# Patient Record
Sex: MALE | Race: WHITE | Employment: UNEMPLOYED | URBAN - METROPOLITAN AREA
[De-identification: names, ages, dates, MRNs, and addresses within clinical notes are randomized per-mention and may not be internally consistent; named-entity substitution may affect disease eponyms.]

---

## 2017-02-02 RX ORDER — NYSTATIN 100000 U/G
CREAM TOPICAL 2 TIMES DAILY
COMMUNITY
End: 2017-06-29

## 2017-02-08 ENCOUNTER — ANESTHESIA EVENT (OUTPATIENT)
Dept: ENDOSCOPY | Age: 62
End: 2017-02-08
Payer: MEDICARE

## 2017-02-09 ENCOUNTER — HOSPITAL ENCOUNTER (OUTPATIENT)
Age: 62
Setting detail: OUTPATIENT SURGERY
Discharge: HOME | End: 2017-02-09
Attending: INTERNAL MEDICINE | Admitting: INTERNAL MEDICINE
Payer: MEDICARE

## 2017-02-09 ENCOUNTER — ANESTHESIA (OUTPATIENT)
Dept: ENDOSCOPY | Age: 62
End: 2017-02-09
Payer: MEDICARE

## 2017-02-09 ENCOUNTER — SURGERY (OUTPATIENT)
Age: 62
End: 2017-02-09

## 2017-02-09 VITALS
HEART RATE: 59 BPM | RESPIRATION RATE: 13 BRPM | BODY MASS INDEX: 39.08 KG/M2 | DIASTOLIC BLOOD PRESSURE: 67 MMHG | WEIGHT: 273 LBS | HEIGHT: 70 IN | SYSTOLIC BLOOD PRESSURE: 111 MMHG | TEMPERATURE: 97.7 F | OXYGEN SATURATION: 95 %

## 2017-02-09 PROCEDURE — 74011000250 HC RX REV CODE- 250

## 2017-02-09 PROCEDURE — 77030011640 HC PAD GRND REM COVD -A: Performed by: INTERNAL MEDICINE

## 2017-02-09 PROCEDURE — 74011000250 HC RX REV CODE- 250: Performed by: NURSE ANESTHETIST, CERTIFIED REGISTERED

## 2017-02-09 PROCEDURE — 74011250636 HC RX REV CODE- 250/636: Performed by: NURSE ANESTHETIST, CERTIFIED REGISTERED

## 2017-02-09 PROCEDURE — 76040000007: Performed by: INTERNAL MEDICINE

## 2017-02-09 PROCEDURE — 76060000031 HC ANESTHESIA FIRST 0.5 HR: Performed by: INTERNAL MEDICINE

## 2017-02-09 PROCEDURE — 88305 TISSUE EXAM BY PATHOLOGIST: CPT | Performed by: INTERNAL MEDICINE

## 2017-02-09 PROCEDURE — 77030013992 HC SNR POLYP ENDOSC BSC -B: Performed by: INTERNAL MEDICINE

## 2017-02-09 PROCEDURE — 74011250636 HC RX REV CODE- 250/636

## 2017-02-09 RX ORDER — LIDOCAINE HYDROCHLORIDE 20 MG/ML
INJECTION, SOLUTION EPIDURAL; INFILTRATION; INTRACAUDAL; PERINEURAL AS NEEDED
Status: DISCONTINUED | OUTPATIENT
Start: 2017-02-09 | End: 2017-02-09 | Stop reason: HOSPADM

## 2017-02-09 RX ORDER — PROPOFOL 10 MG/ML
INJECTION, EMULSION INTRAVENOUS AS NEEDED
Status: DISCONTINUED | OUTPATIENT
Start: 2017-02-09 | End: 2017-02-09 | Stop reason: HOSPADM

## 2017-02-09 RX ORDER — FAMOTIDINE 10 MG/ML
20 INJECTION INTRAVENOUS ONCE
Status: COMPLETED | OUTPATIENT
Start: 2017-02-09 | End: 2017-02-09

## 2017-02-09 RX ORDER — SODIUM CHLORIDE, SODIUM LACTATE, POTASSIUM CHLORIDE, CALCIUM CHLORIDE 600; 310; 30; 20 MG/100ML; MG/100ML; MG/100ML; MG/100ML
75 INJECTION, SOLUTION INTRAVENOUS CONTINUOUS
Status: DISCONTINUED | OUTPATIENT
Start: 2017-02-09 | End: 2017-02-09 | Stop reason: HOSPADM

## 2017-02-09 RX ORDER — SODIUM CHLORIDE 0.9 % (FLUSH) 0.9 %
5-10 SYRINGE (ML) INJECTION EVERY 8 HOURS
Status: DISCONTINUED | OUTPATIENT
Start: 2017-02-09 | End: 2017-02-09 | Stop reason: HOSPADM

## 2017-02-09 RX ORDER — SODIUM CHLORIDE 0.9 % (FLUSH) 0.9 %
5-10 SYRINGE (ML) INJECTION AS NEEDED
Status: DISCONTINUED | OUTPATIENT
Start: 2017-02-09 | End: 2017-02-09 | Stop reason: HOSPADM

## 2017-02-09 RX ADMIN — PROPOFOL 40 MG: 10 INJECTION, EMULSION INTRAVENOUS at 08:49

## 2017-02-09 RX ADMIN — LIDOCAINE HYDROCHLORIDE 20 MG: 20 INJECTION, SOLUTION EPIDURAL; INFILTRATION; INTRACAUDAL; PERINEURAL at 08:49

## 2017-02-09 RX ADMIN — FAMOTIDINE 20 MG: 10 INJECTION, SOLUTION INTRAVENOUS at 07:57

## 2017-02-09 RX ADMIN — PROPOFOL 40 MG: 10 INJECTION, EMULSION INTRAVENOUS at 08:58

## 2017-02-09 RX ADMIN — PROPOFOL 40 MG: 10 INJECTION, EMULSION INTRAVENOUS at 09:02

## 2017-02-09 RX ADMIN — PROPOFOL 40 MG: 10 INJECTION, EMULSION INTRAVENOUS at 08:54

## 2017-02-09 RX ADMIN — SODIUM CHLORIDE, SODIUM LACTATE, POTASSIUM CHLORIDE, AND CALCIUM CHLORIDE 75 ML/HR: 600; 310; 30; 20 INJECTION, SOLUTION INTRAVENOUS at 07:57

## 2017-02-09 NOTE — H&P
[facility]    [email or web address]      Impression:   1.hx of polyps      Plan:     1. Pooler mac all risks discussed       Chief Complaint: hx polyps      HPI:  Rohini Lorenzo is a 62 y.o. male who is being seen on consult for hx of polyps. PMH:   Past Medical History   Diagnosis Date    Anxiety     Arthritis     Back pain 6/22/2010    Cancer [facility])      Prostate    Chronic obstructive pulmonary disease (HCC)      PT DENIES    Hernia of unspecified site of abdominal cavity without mention of obstruction or gangrene     Radiculopathy     Tobacco dependence        PSH:   Past Surgical History   Procedure Laterality Date    Hx hernia repair  08/2007     Ventral    Hx other surgical  10/2008     Prostate    Hx back surgery  11/10    Hx lumbar laminectomy  6/2015     L4, L5       Social HX:   Social History     Social History    Marital status:      Spouse name: N/A    Number of children: N/A    Years of education: N/A     Occupational History    Not on file. Social History Main Topics    Smoking status: Former Smoker     Packs/day: 1.00     Years: 35.00     Quit date: 10/1/2008    Smokeless tobacco: Never Used    Alcohol use 0.6 oz/week     1 Standard drinks or equivalent per week      Comment: twice per year    Drug use: No    Sexual activity: Not on file     Other Topics Concern    Not on file     Social History Narrative       FHX:   Family History   Problem Relation Age of Onset    Heart Disease Father     Cancer Other     Cancer Other        Allergy:   Allergies   Allergen Reactions    Morphine Other (comments)     Chest pain       Home Medications:     Prescriptions Prior to Admission   Medication Sig    nystatin (MYCOSTATIN) topical cream Apply  to affected area two (2) times a day.  ibuprofen (MOTRIN) 800 mg tablet Take  by mouth.  MULTIVIT &MINERALS/FERROUS FUM (MULTI VITAMIN PO) Take  by mouth.     CHOLECALCIFEROL, VITAMIN D3, (VITAMIN D3 PO) Take  by mouth.  rosuvastatin (CRESTOR) 10 mg tablet Take 10 mg by mouth nightly.  oxyCODONE (ROXICODONE) 10 mg Tab tablet Take  by mouth.  CALCIUM CARBONATE/VITAMIN D3 (CALCIUM+D PO) Take  by mouth.  CYANOCOBALAMIN, VITAMIN B-12, (VITAMIN B-12 PO) Take  by mouth.  HYDROCODONE BIT/ACETAMINOPHEN (VICODIN ES PO) Take 750 mg by mouth four (4) times daily.  ALPRAZOLAM (XANAX PO) Take 0.5 mg by mouth four (4) times daily.  DOCOSAHEXANOIC ACID/EPA (FISH OIL PO) Take  by mouth.  HYDROcodone-acetaminophen (NORCO) 5-325 mg per tablet     HYDROcodone-acetaminophen (NORCO)  mg tablet Take 1 Tab by mouth every six (6) hours as needed for Pain. Max Daily Amount: 4 Tabs.  clobetasol (TEMOVATE) 0.05 % topical cream Apply  to affected area two (2) times a day.  VARDENAFIL HCL (LEVITRA PO) Take  by mouth.  ALBUTEROL IN Take  by inhalation.  etodolac (LODINE) 500 mg tablet Take 500 mg by mouth two (2) times a day.  Diclofenac Sodium (VOLTAREN) 1 % topical gel Apply 4 g to affected area four (4) times daily. Review of Systems:     Constitutional: No fevers, chills, weight loss, fatigue. Skin: No rashes, pruritis, jaundice, ulcerations, erythema. HENT: No headaches, nosebleeds, sinus pressure, rhinorrhea, sore throat. Eyes: No visual changes, blurred vision, eye pain, photophobia, jaundice. Cardiovascular: No chest pain, heart palpitations. Respiratory: No cough, SOB, wheezing, chest discomfort, orthopnea. Gastrointestinal: neg   Genitourinary: No dysuria, bleeding, discharge, pyuria. Musculoskeletal: No weakness, arthralgias, wasting. Endo: No sweats. Heme: No bruising, easy bleeding. Allergies: As noted. Neurological: Cranial nerves intact. Alert and oriented. Gait not assessed. Psychiatric:  No anxiety, depression, hallucinations.                  Visit Vitals    /65    Pulse 62    Temp 97.8 °F (36.6 °C)    Resp 18    Ht 5' 10\" (1.778 m)    Wt 123.8 kg (273 lb)    SpO2 96%    BMI 39.17 kg/m2       Physical Assessment:     constitutional: appearance: well developed, well nourished, normal habitus, no deformities, in no acute distress. skin: inspection: no rashes, ulcers, icterus or other lesions; no clubbing or telangiectasias. palpation: no induration or subcutaneos nodules. eyes: inspection: normal conjunctivae and lids; no jaundice pupils: symmetrical, normoreactive to light, normal accommodation and size. ENMT: mouth: normal oral mucosa,lips and gums; good dentition. oropharynx: normal tongue, hard and soft palate; posterior pharynx without erythema, exudate or lesions. neck: no masses organomegaly or tenderness. respiratory: effort: normal chest excursion; no intercostal retraction or accessory muscle use. cardiovascular: abdominal aorta: normal size and position; no bruits. palpation: PMI of normal size and position; normal rhythm; no thrill or murmurs. abdominal: abdomen: normal consistency; no tenderness or masses. hernias: no hernias appreciated. liver: normal size and consistency. spleen: not palpable. rectal: hemoccult/guaiac: not performed. musculoskeletal: no deformities or muscle wasting   lymphatic: axilae: not palpable. groin: not palpable. neck: within normal limits. other: not palpable. neurologic: cranial nerves: II-XII normal.   psychiatric: judgement/insight: within normal limits. memory: within normal limits for recent and remote events. mood and affect: no evidence of depression, anxiety or agitation. orientation: oriented to time, space and person. Basic Metabolic Profile   No results for input(s): NA, K, CL, CO2, BUN, GLU, CA, MG, PHOS in the last 72 hours. No lab exists for component: CREAT      CBC w/Diff    No results for input(s): WBC, RBC, HGB, HCT, MCV, MCH, MCHC, RDW, PLT, HGBEXT, HCTEXT, PLTEXT in the last 72 hours.     No lab exists for component: MPV No results for input(s): GRANS, LYMPH, EOS, PRO, MYELO, METAS, BLAST in the last 72 hours. No lab exists for component: MONO, BASO     Hepatic Function   No results for input(s): ALB, TP, TBILI, GPT, SGOT, AP, AML, LPSE in the last 72 hours. No lab exists for component: Shakeel Delarosa MD, MHAZEL. [facility], [address] [place] [address]  [email or web address]

## 2017-02-09 NOTE — DISCHARGE INSTRUCTIONS
Colonoscopy: What to Expect at [address] [place] [address]  After you have a colonoscopy, you will stay at the clinic for 1 to 2 hours until the medicines wear off. Then you can go home. But you will need to arrange for a ride. Your doctor will tell you when you can eat and do your other usual activities. Your doctor will talk to you about when you will need your next colonoscopy. Your doctor can help you decide how often you need to be checked. This will depend on the results of your test and your risk for colorectal cancer. After the test, you may be bloated or have gas pains. You may need to pass gas. If a biopsy was done or a polyp was removed, you may have streaks of blood in your stool (feces) for a few days. This care sheet gives you a general idea about how long it will take for you to recover. But each person recovers at a different pace. Follow the steps below to get better as quickly as possible. How can you care for yourself at home? Activity  · Rest when you feel tired. · You can do your normal activities when it feels okay to do so. Diet  · Follow your doctor's directions for eating. · Unless your doctor has told you not to, drink plenty of fluids. This helps to replace the fluids that were lost during the colon prep. · Do not drink alcohol. Medicines  · If polyps were removed or a biopsy was done during the test, your doctor may tell you not to take aspirin or other anti-inflammatory medicines for a few days. These include ibuprofen (Advil, Motrin) and naproxen (Aleve). Other instructions  · For your safety, do not drive or operate machinery until the medicine wears off and you can think clearly. Your doctor may tell you not to drive or operate machinery until the day after your test.  · Do not sign legal documents or make major decisions until the medicine wears off and you can think clearly. The anesthesia can make it hard for you to fully understand what you are agreeing to.   Follow-up care is a key part of your treatment and safety. Be sure to make and go to all appointments, and call your doctor if you are having problems. It's also a good idea to know your test results and keep a list of the medicines you take. When should you call for help? Call 911 anytime you think you may need emergency care. For example, call if:  · You passed out (lost consciousness). · You pass maroon or bloody stools. · You have severe belly pain. Call your doctor now or seek immediate medical care if:  · Your stools are black and tarlike. · Your stools have streaks of blood, but you did not have a biopsy or any polyps removed. · You have belly pain, or your belly is swollen and firm. · You vomit. · You have a fever. · You are very dizzy. Watch closely for changes in your health, and be sure to contact your doctor if you have any problems. Where can you learn more? Go to Style on Screen[employer]be  Enter E264 in the search box to learn more about \"Colonoscopy: What to Expect at Home. \"   © 7723-5294 [employer]. Care instructions adapted under license by [employer] (which disclaims liability or warranty for this information). This care instruction is for use with your licensed healthcare professional. If you have questions about a medical condition or this instruction, always ask your healthcare professional. [address] any warranty or liability for your use of this information. Content Version: 20.8.504888; Current as of: November 14, 2014     Colon Polyps: Care Instructions  Your Care Instructions    Colon polyps are growths in the colon or the rectum. The cause of most colon polyps is not known, and most people who get them do not have any problems. But a certain kind can turn into cancer. For this reason, regular testing for colon polyps is important for people age 48 and older and anyone who has an increased risk for colon cancer.   Polyps are usually found through routine colon cancer screening tests. Although most colon polyps are not cancerous, they are usually removed and then tested for cancer. Screening for colon cancer saves lives because the cancer can usually be cured if it is caught early. If you have a polyp that is the type that can turn into cancer, you may need more tests to examine your entire colon. The doctor will remove any other polyps that he or she finds, and you will be tested more often. Follow-up care is a key part of your treatment and safety. Be sure to make and go to all appointments, and call your doctor if you are having problems. It's also a good idea to know your test results and keep a list of the medicines you take. How can you care for yourself at home? Regular exams to look for colon polyps are the best way to prevent polyps from turning into colon cancer. These can include stool tests, sigmoidoscopy, colonoscopy, and CT colonography. Talk with your doctor about a testing schedule that is right for you. To prevent polyps  There is no home treatment that can prevent colon polyps. But these steps may help lower your risk for cancer. · Stay active. Being active can help you get to and stay at a healthy weight. Try to exercise on most days of the week. Walking is a good choice. · Eat well. Choose a variety of vegetables, fruits, legumes (such as peas and beans), fish, poultry, and whole grains. · Do not smoke. If you need help quitting, talk to your doctor about stop-smoking programs and medicines. These can increase your chances of quitting for good. · If you drink alcohol, limit how much you drink. Limit alcohol to 2 drinks a day for men and 1 drink a day for women. When should you call for help? Call your doctor now or seek immediate medical care if:  · You have severe belly pain. · Your stools are maroon or very bloody.   Watch closely for changes in your health, and be sure to contact your doctor if:  · You have a fever.  · You have nausea or vomiting. · You have a change in bowel habits (new constipation or diarrhea). · Your symptoms get worse or are not improving as expected. Where can you learn more? Go to http://yogi-ethan.info/. Enter 95 [id number] in the search box to learn more about \"Colon Polyps: Care Instructions. \"  Current as of: August 24, 2016  Content Version: 11.1  © 6225-1608 [employer]. Care instructions adapted under license by [employer] (which disclaims liability or warranty for this information). If you have questions about a medical condition or this instruction, always ask your healthcare professional. [place] [address] any warranty or liability for your use of this information. DISCHARGE SUMMARY from Nurse     POST-PROCEDURE INSTRUCTIONS:    Call your Physician if you:  ? Observe any excess bleeding. ? Develop a temperature over 100.5o F.  ? Experience abdominal, shoulder or chest pain. ? Notice any signs of decreased circulation or nerve impairment to an extremity such as a change in color, persistent numbness, tingling, coldness or increase in pain. ? Vomit blood or you have nausea and vomiting lasting longer than 4 hours. ? Are unable to take medications. ? Are unable to urinate within 8 hours after discharge following general anesthesia or intravenous sedation. For the next 24 hours after receiving general anesthesia or intravenous sedation, or while taking prescription Narcotics, limit your activities:  ? Do NOT drive a motor vehicle, operate hazard machinery or power tools, or perform tasks that require coordination. The medication you received during your procedure may have some effect on your mental awareness. ? Do NOT make important personal or business decisions. The medication you received during your procedure may have some effect on your mental awareness. ? Do NOT drink alcoholic beverages.   These drinks do not mix well with the medications that have been given to you. ? Upon discharge from the hospital, you must be accompanied by a responsible adult. ? Resume your diet as directed by your physician. ? Resume medications as your physician has prescribed. ? Please give a list of your current medications to your Primary Care Provider. ? Please update this list whenever your medications are discontinued, doses are changed, or new medications (including over-the-counter products) are added. ? Please carry medication information at all times in case of emergency situations. These are general instructions for a healthy lifestyle:    No smoking/ No tobacco products/ Avoid exposure to second hand smoke.  Surgeon General's Warning:  Quitting smoking now greatly reduces serious risk to your health. Obesity, smoking, and a sedentary lifestyle greatly increase your risk for illness.  A healthy diet, regular physical exercise & weight monitoring are important for maintaining a healthy lifestyle   You may be retaining fluid if you have a history of heart failure or if you experience any of the following symptoms:  Weight gain of 3 pounds or more overnight or 5 pounds in a week, increased swelling in our hands or feet or shortness of breath while lying flat in bed. Please call your doctor as soon as you notice any of these symptoms; do not wait until your next office visit. Recognize signs and symptoms of STROKE:  F  -  Face looks uneven  A  -  Arms unable to move or move unevenly  S  -  Speech slurred or non-existent  T  -  Time to call 911 - as soon as signs and symptoms begin - DO NOT go back to bed or wait to see If you get better - TIME IS BRAIN. Colorectal Screening   Colorectal cancer almost always develops from precancerous polyps (abnormal growths) in the colon or rectum. Screening tests can find precancerous polyps, so that they can be removed before they turn into cancer.  Screening tests can also find colorectal cancer early, when treatment works best.  [employer] Speak with your physician about when you should begin screening and how often you should be tested. Additional Information    If you have questions, please call [phone]. Remember, MyChart is NOT to be used for urgent needs. For medical emergencies, dial 911. Educational references and/or instructions provided during this visit included:    Colon Polyps      APPOINTMENTS:    Please make a follow-up appointment with your physician. Discharge information has been reviewed with the patient. The patient verbalized understanding.

## 2017-02-09 NOTE — ANESTHESIA PREPROCEDURE EVALUATION
Anesthetic History               Review of Systems / Medical History  Patient summary reviewed, nursing notes reviewed and pertinent labs reviewed    Pulmonary    COPD: mild               Neuro/Psych              Cardiovascular  Within defined limits                     GI/Hepatic/Renal  Within defined limits              Endo/Other        Obesity and arthritis     Other Findings   Comments: Current Smoker? NO       Elective Surgery? Yes       Abstained from smoking 24 hours prior to anesthesia? N/A    Risk Factors for Postoperative nausea/vomiting:       History of postoperative nausea/vomiting? NO       Female? NO       Motion sickness? NO       Intended opioid administration for postoperative analgesia?   NO           Physical Exam    Airway  Mallampati: II  TM Distance: 4 - 6 cm  Neck ROM: normal range of motion   Mouth opening: Normal     Cardiovascular  Regular rate and rhythm,  S1 and S2 normal,  no murmur, click, rub, or gallop             Dental  No notable dental hx       Pulmonary  Breath sounds clear to auscultation               Abdominal  GI exam deferred       Other Findings            Anesthetic Plan    ASA: 3  Anesthesia type: MAC          Induction: Intravenous  Anesthetic plan and risks discussed with: Patient

## 2017-02-09 NOTE — IP AVS SNAPSHOT
[address] [address] Tamara Knox [address] [address] [address] [phone] Patient: Zahraa Sanders MRN: [id number] IHW:3/78/2705 You are allergic to the following Allergen Reactions Morphine Other (comments) Chest pain Recent Documentation Height Weight BMI Smoking Status 1.778 m 123.8 kg 39.17 kg/m2 Former Smoker Emergency Contacts Name Discharge Info Relation Home Work Mobile [address] CAREGIVER [3] Spouse [3] [phone] About your hospitalization You were admitted on:  February 9, 2017 You last received care in the:  [facility] You were discharged on:  February 9, 2017 Unit phone number:  [phone] Why you were hospitalized Your primary diagnosis was:  Not on File Providers Seen During Your Hospitalizations Provider Role Specialty Primary office phone Nayely Chopra MD Attending Provider Gastroenterology [phone] Your Primary Care Physician (PCP) Primary Care Physician Office Phone Office Fax Michael Paget [phone] Follow-up Information Follow up With Details Comments Contact Info Nayely Chopra MD   [address] [facility] 
[phone] Shira Falk MD   [address] [place] [address] [address] [facility] [address] [phone] Current Discharge Medication List  
  
CONTINUE these medications which have NOT CHANGED Dose & Instructions Dispensing Information Comments Morning Noon Evening Bedtime ALBUTEROL IN Your next dose is: Today, Tomorrow Other:  _________ Take  by inhalation. Refills:  0  
     
   
   
   
  
 CALCIUM+D PO Your next dose is: Today, Tomorrow Other:  _________ Take  by mouth. Refills:  0 clobetasol 0.05 % topical cream  
Commonly known as:  Acquanetta Doctor Your next dose is: Today, Tomorrow Other:  _________ Apply  to affected area two (2) times a day. Refills:  0  
     
   
   
   
  
 CRESTOR 10 mg tablet Generic drug:  rosuvastatin Your next dose is: Today, Tomorrow Other:  _________ Dose:  10 mg Take 10 mg by mouth nightly. Refills:  0  
     
   
   
   
  
 etodolac 500 mg tablet Commonly known as:  LODINE Your next dose is: Today, Tomorrow Other:  _________ Dose:  500 mg Take 500 mg by mouth two (2) times a day. Refills:  0  
     
   
   
   
  
 FISH OIL PO Your next dose is: Today, Tomorrow Other:  _________ Take  by mouth. Refills:  0  
     
   
   
   
  
 ibuprofen 800 mg tablet Commonly known as:  MOTRIN Your next dose is: Today, Tomorrow Other:  _________ Take  by mouth. Refills:  0 LEVITRA PO Your next dose is: Today, Tomorrow Other:  _________ Take  by mouth. Refills:  0 MULTI VITAMIN PO Your next dose is: Today, Tomorrow Other:  _________ Take  by mouth. Refills:  0  
     
   
   
   
  
 nystatin topical cream  
Commonly known as:  MYCOSTATIN Your next dose is: Today, Tomorrow Other:  _________ Apply  to affected area two (2) times a day. Refills:  0  
     
   
   
   
  
 oxyCODONE IR 10 mg Tab immediate release tablet Commonly known as:  Corrinne Mitten Your next dose is: Today, Tomorrow Other:  _________ Take  by mouth. Refills:  0  
     
   
   
   
  
 * VICODIN ES PO Your next dose is: Today, Tomorrow Other:  _________ Dose:  750 mg Take 750 mg by mouth four (4) times daily. Refills:  0 * HYDROcodone-acetaminophen  mg tablet Commonly known as:  Chika Kapadia Your next dose is: Today, Tomorrow Other:  _________ Dose:  1 Tab Take 1 Tab by mouth every six (6) hours as needed for Pain. Max Daily Amount: 4 Tabs. Quantity:  30 Tab Refills:  0  
     
   
   
   
  
 * HYDROcodone-acetaminophen 5-325 mg per tablet Commonly known as:  Chika Kapadia Your next dose is: Today, Tomorrow Other:  _________ Refills:  0  
     
   
   
   
  
 VITAMIN B-12 PO Your next dose is: Today, Tomorrow Other:  _________ Take  by mouth. Refills:  0  
     
   
   
   
  
 VITAMIN D3 PO Your next dose is: Today, Tomorrow Other:  _________ Take  by mouth. Refills:  0 VOLTAREN 1 % Gel Generic drug:  diclofenac Your next dose is: Today, Tomorrow Other:  _________ Dose:  4 g Apply 4 g to affected area four (4) times daily. Refills:  0  
     
   
   
   
  
 XANAX PO Your next dose is: Today, Tomorrow Other:  _________ Dose:  0.5 mg Take 0.5 mg by mouth four (4) times daily. Refills:  0  
     
   
   
   
  
 * Notice: This list has 3 medication(s) that are the same as other medications prescribed for you. Read the directions carefully, and ask your doctor or other care provider to review them with you. Discharge Instructions Colonoscopy: What to Expect at [facility] Your Recovery After you have a colonoscopy, you will stay at the clinic for 1 to 2 hours until the medicines wear off. Then you can go home. But you will need to arrange for a ride. Your doctor will tell you when you can eat and do your other usual activities. Your doctor will talk to you about when you will need your next colonoscopy. Your doctor can help you decide how often you need to be checked.  This will depend on the results of your test and your risk for colorectal cancer. After the test, you may be bloated or have gas pains. You may need to pass gas. If a biopsy was done or a polyp was removed, you may have streaks of blood in your stool (feces) for a few days. This care sheet gives you a general idea about how long it will take for you to recover. But each person recovers at a different pace. Follow the steps below to get better as quickly as possible. How can you care for yourself at home? Activity · Rest when you feel tired. · You can do your normal activities when it feels okay to do so. Diet · Follow your doctor's directions for eating. · Unless your doctor has told you not to, drink plenty of fluids. This helps to replace the fluids that were lost during the colon prep. · Do not drink alcohol. Medicines · If polyps were removed or a biopsy was done during the test, your doctor may tell you not to take aspirin or other anti-inflammatory medicines for a few days. These include ibuprofen (Advil, Motrin) and naproxen (Aleve). Other instructions · For your safety, do not drive or operate machinery until the medicine wears off and you can think clearly. Your doctor may tell you not to drive or operate machinery until the day after your test. 
· Do not sign legal documents or make major decisions until the medicine wears off and you can think clearly. The anesthesia can make it hard for you to fully understand what you are agreeing to. Follow-up care is a key part of your treatment and safety. Be sure to make and go to all appointments, and call your doctor if you are having problems. It's also a good idea to know your test results and keep a list of the medicines you take. When should you call for help? Call 911 anytime you think you may need emergency care. For example, call if: 
· You passed out (lost consciousness). · You pass maroon or bloody stools. · You have severe belly pain. Call your doctor now or seek immediate medical care if: · Your stools are black and tarlike. · Your stools have streaks of blood, but you did not have a biopsy or any polyps removed. · You have belly pain, or your belly is swollen and firm. · You vomit. · You have a fever. · You are very dizzy. Watch closely for changes in your health, and be sure to contact your doctor if you have any problems. Where can you learn more? Go to [employer] Enter E264 in the search box to learn more about \"Colonoscopy: What to Expect at Home. \"  
© 3223-1142 Healthwise, [employer]. Care instructions adapted under license by Tash Monsalve (which disclaims liability or warranty for this information). This care instruction is for use with your licensed healthcare professional. If you have questions about a medical condition or this instruction, always ask your healthcare professional. [address] any warranty or liability for your use of this information. Content Version: 57.6.954667; Current as of: November 14, 2014 Colon Polyps: Care Instructions Your Care Instructions Colon polyps are growths in the colon or the rectum. The cause of most colon polyps is not known, and most people who get them do not have any problems. But a certain kind can turn into cancer. For this reason, regular testing for colon polyps is important for people age 48 and older and anyone who has an increased risk for colon cancer. Polyps are usually found through routine colon cancer screening tests. Although most colon polyps are not cancerous, they are usually removed and then tested for cancer. Screening for colon cancer saves lives because the cancer can usually be cured if it is caught early. If you have a polyp that is the type that can turn into cancer, you may need more tests to examine your entire colon. The doctor will remove any other polyps that he or she finds, and you will be tested more often. Follow-up care is a key part of your treatment and safety. Be sure to make and go to all appointments, and call your doctor if you are having problems. It's also a good idea to know your test results and keep a list of the medicines you take. How can you care for yourself at home? Regular exams to look for colon polyps are the best way to prevent polyps from turning into colon cancer. These can include stool tests, sigmoidoscopy, colonoscopy, and CT colonography. Talk with your doctor about a testing schedule that is right for you. To prevent polyps There is no home treatment that can prevent colon polyps. But these steps may help lower your risk for cancer. · Stay active. Being active can help you get to and stay at a healthy weight. Try to exercise on most days of the week. Walking is a good choice. · Eat well. Choose a variety of vegetables, fruits, legumes (such as peas and beans), fish, poultry, and whole grains. · Do not smoke. If you need help quitting, talk to your doctor about stop-smoking programs and medicines. These can increase your chances of quitting for good. · If you drink alcohol, limit how much you drink. Limit alcohol to 2 drinks a day for men and 1 drink a day for women. When should you call for help? Call your doctor now or seek immediate medical care if: 
· You have severe belly pain. · Your stools are maroon or very bloody. Watch closely for changes in your health, and be sure to contact your doctor if: 
· You have a fever. · You have nausea or vomiting. · You have a change in bowel habits (new constipation or diarrhea). · Your symptoms get worse or are not improving as expected. Where can you learn more? Go to http://yogi-ethan.info/. Enter 95 [id number] in the search box to learn more about \"Colon Polyps: Care Instructions. \" Current as of: August 24, 2016 Content Version: 11.1 © 2179-1112 [employer].  Care instructions adapted under license by [address] (which disclaims liability or warranty for this information). If you have questions about a medical condition or this instruction, always ask your healthcare professional. [address] any warranty or liability for your use of this information. DISCHARGE SUMMARY from Nurse POST-PROCEDURE INSTRUCTIONS: 
 
Call your Physician if you: 
? Observe any excess bleeding. ? Develop a temperature over 100.5o F. 
? Experience abdominal, shoulder or chest pain. ? Notice any signs of decreased circulation or nerve impairment to an extremity such as a change in color, persistent numbness, tingling, coldness or increase in pain. ? Vomit blood or you have nausea and vomiting lasting longer than 4 hours. ? Are unable to take medications. ? Are unable to urinate within 8 hours after discharge following general anesthesia or intravenous sedation. For the next 24 hours after receiving general anesthesia or intravenous sedation, or while taking prescription Narcotics, limit your activities: 
? Do NOT drive a motor vehicle, operate hazard machinery or power tools, or perform tasks that require coordination. The medication you received during your procedure may have some effect on your mental awareness. ? Do NOT make important personal or business decisions. The medication you received during your procedure may have some effect on your mental awareness. ? Do NOT drink alcoholic beverages. These drinks do not mix well with the medications that have been given to you. ? Upon discharge from the hospital, you must be accompanied by a responsible adult. ? Resume your diet as directed by your physician. ? Resume medications as your physician has prescribed. ? Please give a list of your current medications to your Primary Care Provider. ?  Please update this list whenever your medications are discontinued, doses are changed, or new medications (including over-the-counter products) are added. ? Please carry medication information at all times in case of emergency situations. These are general instructions for a healthy lifestyle: No smoking/ No tobacco products/ Avoid exposure to second hand smoke. ? Surgeon General's Warning:  Quitting smoking now greatly reduces serious risk to your health. Obesity, smoking, and a sedentary lifestyle greatly increase your risk for illness. ? A healthy diet, regular physical exercise & weight monitoring are important for maintaining a healthy lifestyle ? You may be retaining fluid if you have a history of heart failure or if you experience any of the following symptoms:  Weight gain of 3 pounds or more overnight or 5 pounds in a week, increased swelling in our hands or feet or shortness of breath while lying flat in bed. Please call your doctor as soon as you notice any of these symptoms; do not wait until your next office visit. Recognize signs and symptoms of STROKE: 
F  -  Face looks uneven A  -  Arms unable to move or move unevenly S  -  Speech slurred or non-existent T  -  Time to call 911 - as soon as signs and symptoms begin - DO NOT go back to bed or wait to see If you get better - TIME IS BRAIN. Colorectal Screening ? Colorectal cancer almost always develops from precancerous polyps (abnormal growths) in the colon or rectum. Screening tests can find precancerous polyps, so that they can be removed before they turn into cancer. Screening tests can also find colorectal cancer early, when treatment works best. 
? Speak with your physician about when you should begin screening and how often you should be tested. Additional Information If you have questions, please call [phone]. Remember, [employer] is NOT to be used for urgent needs. For medical emergencies, dial 911.  
 
Educational references and/or instructions provided during this visit included: 
 
Colon Polyps APPOINTMENTS: 
 
Please make a follow-up appointment with your physician. Discharge information has been reviewed with the patient. The patient verbalized understanding. Discharge Orders None Introducing [facility]! Dear Mitesh Arriola: 
Thank you for requesting a [employer] account. Our records indicate that you already have an active [employer] account. You can access your account anytime at [email or web address] Did you know that you can access your hospital and ER discharge instructions at any time in [employer]? You can also review all of your test results from your hospital stay or ER visit. Additional Information If you have questions, please visit the Frequently Asked Questions section of the [employer] website at [email or web address] Remember, [employer] is NOT to be used for urgent needs. For medical emergencies, dial 911. Now available from your iPhone and Android! General Information Please provide this summary of care documentation to your next provider. Patient Signature:  ____________________________________________________________ Date:  ____________________________________________________________  
  
Jacqueline Obrien Provider Signature:  ____________________________________________________________ Date:  ____________________________________________________________

## 2017-02-09 NOTE — ANESTHESIA POSTPROCEDURE EVALUATION
Post-Anesthesia Evaluation and Assessment    Patient: Trupti Pradhan MRN: [id number]  SSN: [id number]    YOB: 1955  Age: 62 y.o. Sex: male       Cardiovascular Function/Vital Signs  Visit Vitals    /67    Pulse (!) 59    Temp 36.5 °C (97.7 °F)    Resp 13    Ht 5' 10\" (1.778 m)    Wt 123.8 kg (273 lb)    SpO2 95%    BMI 39.17 kg/m2       Patient is status post MAC anesthesia for Procedure(s):  COLONOSCOPY / polypectomy. Nausea/Vomiting: None    Postoperative hydration reviewed and adequate. Pain:  Pain Scale 1: Numeric (0 - 10) (02/09/17 0940)  Pain Intensity 1: 0 (02/09/17 0940)   Managed    Neurological Status: At baseline    Mental Status and Level of Consciousness: Arousable    Pulmonary Status:   O2 Device: Room air (02/09/17 0940)   Adequate oxygenation and airway patent    Complications related to anesthesia: None    Post-anesthesia assessment completed.  No concerns    Signed By: Jose Elmore MD     February 9, 2017

## 2017-03-15 ENCOUNTER — HOSPITAL ENCOUNTER (OUTPATIENT)
Dept: ULTRASOUND IMAGING | Age: 62
Discharge: HOME | End: 2017-03-15
Payer: MEDICARE

## 2017-03-15 DIAGNOSIS — Z87.891 FORMER SMOKER: ICD-10-CM

## 2017-03-15 PROCEDURE — 76706 US ABDL AORTA SCREEN AAA: CPT

## 2017-06-29 ENCOUNTER — OFFICE VISIT (OUTPATIENT)
Dept: FAMILY MEDICINE CLINIC | Facility: CLINIC | Age: 62
End: 2017-06-29

## 2017-06-29 VITALS
TEMPERATURE: 96.9 F | RESPIRATION RATE: 24 BRPM | HEIGHT: 70 IN | WEIGHT: 270 LBS | DIASTOLIC BLOOD PRESSURE: 76 MMHG | HEART RATE: 56 BPM | SYSTOLIC BLOOD PRESSURE: 133 MMHG | OXYGEN SATURATION: 95 % | BODY MASS INDEX: 38.65 KG/M2

## 2017-06-29 DIAGNOSIS — R73.9 HYPERGLYCEMIA: ICD-10-CM

## 2017-06-29 DIAGNOSIS — E78.5 HYPERLIPIDEMIA, UNSPECIFIED HYPERLIPIDEMIA TYPE: ICD-10-CM

## 2017-06-29 DIAGNOSIS — M51.34 THORACIC DEGENERATIVE DISC DISEASE: Primary | ICD-10-CM

## 2017-06-29 DIAGNOSIS — M96.1 LUMBAR POST-LAMINECTOMY SYNDROME: ICD-10-CM

## 2017-06-29 DIAGNOSIS — F41.9 ANXIETY: ICD-10-CM

## 2017-06-29 DIAGNOSIS — E66.01 MORBID OBESITY, UNSPECIFIED OBESITY TYPE (HCC): ICD-10-CM

## 2017-06-29 RX ORDER — OXYCODONE HYDROCHLORIDE 10 MG/1
10 TABLET ORAL
Qty: 120 TAB | Refills: 0 | Status: SHIPPED | OUTPATIENT
Start: 2017-06-29 | End: 2017-06-29 | Stop reason: SDUPTHER

## 2017-06-29 RX ORDER — OXYCODONE HYDROCHLORIDE 10 MG/1
10 TABLET ORAL
Qty: 180 TAB | Refills: 0 | Status: SHIPPED | OUTPATIENT
Start: 2017-06-29 | End: 2020-08-26

## 2017-06-29 RX ORDER — ESCITALOPRAM OXALATE 10 MG/1
10 TABLET ORAL DAILY
Qty: 30 TAB | Refills: 3 | Status: SHIPPED | OUTPATIENT
Start: 2017-06-29 | End: 2020-08-26

## 2017-06-29 RX ORDER — CHOLECALCIFEROL (VITAMIN D3) 125 MCG
CAPSULE ORAL
COMMUNITY
End: 2022-06-16

## 2017-06-29 RX ORDER — ZINC GLUCONATE 10 MG
LOZENGE ORAL
COMMUNITY
End: 2022-09-07

## 2017-06-29 RX ORDER — ALPRAZOLAM 0.5 MG/1
0.5 TABLET ORAL
Qty: 90 TAB | Refills: 0 | Status: SHIPPED | OUTPATIENT
Start: 2017-06-29 | End: 2017-06-29 | Stop reason: SDUPTHER

## 2017-06-29 RX ORDER — ALPRAZOLAM 0.5 MG/1
0.5 TABLET ORAL
Qty: 90 TAB | Refills: 0 | Status: SHIPPED | OUTPATIENT
Start: 2017-06-29 | End: 2018-11-28 | Stop reason: ALTCHOICE

## 2017-06-29 NOTE — PROGRESS NOTES
Chief Complaint   Patient presents with   [facility].     Previous pcp Dr. Alexis Dumont Medication Refill    Back Pain     1. Have you been to the ER, urgent care clinic since your last visit? Hospitalized since your last visit? No    2. Have you seen or consulted any other health care providers outside of the [facility] since your last visit? Include any pap smears or colon screening.  No

## 2017-06-29 NOTE — PATIENT INSTRUCTIONS
Body Mass Index: Care Instructions  Your Care Instructions    Body mass index (BMI) can help you see if your weight is raising your risk for health problems. It uses a formula to compare how much you weigh with how tall you are. · A BMI lower than 18.5 is considered underweight. · A BMI between 18.5 and 24.9 is considered healthy. · A BMI between 25 and 29.9 is considered overweight. A BMI of 30 or higher is considered obese. If your BMI is in the normal range, it means that you have a lower risk for weight-related health problems. If your BMI is in the overweight or obese range, you may be at increased risk for weight-related health problems, such as high blood pressure, heart disease, stroke, arthritis or joint pain, and diabetes. If your BMI is in the underweight range, you may be at increased risk for health problems such as fatigue, lower protection (immunity) against illness, muscle loss, bone loss, hair loss, and hormone problems. BMI is just one measure of your risk for weight-related health problems. You may be at higher risk for health problems if you are not active, you eat an unhealthy diet, or you drink too much alcohol or use tobacco products. Follow-up care is a key part of your treatment and safety. Be sure to make and go to all appointments, and call your doctor if you are having problems. It's also a good idea to know your test results and keep a list of the medicines you take. How can you care for yourself at home? · Practice healthy eating habits. This includes eating plenty of fruits, vegetables, whole grains, lean protein, and low-fat dairy. · If your doctor recommends it, get more exercise. Walking is a good choice. Bit by bit, increase the amount you walk every day. Try for at least 30 minutes on most days of the week. · Do not smoke. Smoking can increase your risk for health problems. If you need help quitting, talk to your doctor about stop-smoking programs and medicines. These can increase your chances of quitting for good. · Limit alcohol to 2 drinks a day for men and 1 drink a day for women. Too much alcohol can cause health problems. If you have a BMI higher than 25  · Your doctor may do other tests to check your risk for weight-related health problems. This may include measuring the distance around your waist. A waist measurement of more than 40 inches in men or 35 inches in women can increase the risk of weight-related health problems. · Talk with your doctor about steps you can take to stay healthy or improve your health. You may need to make lifestyle changes to lose weight and stay healthy, such as changing your diet and getting regular exercise. If you have a BMI lower than 18.5  · Your doctor may do other tests to check your risk for health problems. · Talk with your doctor about steps you can take to stay healthy or improve your health. You may need to make lifestyle changes to gain or maintain weight and stay healthy, such as getting more healthy foods in your diet and doing exercises to build muscle. Where can you learn more? Go to http://yogi-ethan.info/. Enter S176 in the search box to learn more about \"Body Mass Index: Care Instructions. \"  Current as of: January 23, 2017  Content Version: 11.3  © 8354-6912 [employer]. Care instructions adapted under license by [employer] (which disclaims liability or warranty for this information). If you have questions about a medical condition or this instruction, always ask your healthcare professional. [place] [address] any warranty or liability for your use of this information. Anxiety Disorder: Care Instructions  Your Care Instructions  Anxiety is a normal reaction to stress. Difficult situations can cause you to have symptoms such as sweaty palms and a nervous feeling. In an anxiety disorder, the symptoms are far more severe.  Constant worry, muscle tension, trouble sleeping, nausea and diarrhea, and other symptoms can make normal daily activities difficult or impossible. These symptoms may occur for no reason, and they can affect your work, school, or social life. Medicines, counseling, and self-care can all help. Follow-up care is a key part of your treatment and safety. Be sure to make and go to all appointments, and call your doctor if you are having problems. It's also a good idea to know your test results and keep a list of the medicines you take. How can you care for yourself at home? · Take medicines exactly as directed. Call your doctor if you think you are having a problem with your medicine. · Go to your counseling sessions and follow-up appointments. · Recognize and accept your anxiety. Then, when you are in a situation that makes you anxious, say to yourself, \"This is not an emergency. I feel uncomfortable, but I am not in danger. I can keep going even if I feel anxious. \"  · Be kind to your body:  ¨ Relieve tension with exercise or a massage. ¨ Get enough rest.  ¨ Avoid alcohol, caffeine, nicotine, and illegal drugs. They can increase your anxiety level and cause sleep problems. ¨ Learn and do relaxation techniques. See below for more about these techniques. · Engage your mind. Get out and do something you enjoy. Go to a funny movie, or take a walk or hike. Plan your day. Having too much or too little to do can make you anxious. · Keep a record of your symptoms. Discuss your fears with a good friend or family member, or join a support group for people with similar problems. Talking to others sometimes relieves stress. · Get involved in social groups, or volunteer to help others. Being alone sometimes makes things seem worse than they are. · Get at least 30 minutes of exercise on most days of the week to relieve stress. Walking is a good choice.  You also may want to do other activities, such as running, swimming, cycling, or playing tennis or team sports. Relaxation techniques  Do relaxation exercises 10 to 20 minutes a day. You can play soothing, relaxing music while you do them, if you wish. · Tell others in your house that you are going to do your relaxation exercises. Ask them not to disturb you. · Find a comfortable place, away from all distractions and noise. · Lie down on your back, or sit with your back straight. · Focus on your breathing. Make it slow and steady. · Breathe in through your nose. Breathe out through either your nose or mouth. · Breathe deeply, filling up the area between your navel and your rib cage. Breathe so that your belly goes up and down. · Do not hold your breath. · Breathe like this for 5 to 10 minutes. Notice the feeling of calmness throughout your whole body. As you continue to breathe slowly and deeply, relax by doing the following for another 5 to 10 minutes:  · Tighten and relax each muscle group in your body. You can begin at your toes and work your way up to your head. · Imagine your muscle groups relaxing and becoming heavy. · Empty your mind of all thoughts. · Let yourself relax more and more deeply. · Become aware of the state of calmness that surrounds you. · When your relaxation time is over, you can bring yourself back to alertness by moving your fingers and toes and then your hands and feet and then stretching and moving your entire body. Sometimes people fall asleep during relaxation, but they usually wake up shortly afterward. · Always give yourself time to return to full alertness before you drive a car or do anything that might cause an accident if you are not fully alert. Never play a relaxation tape while you drive a car. When should you call for help? Call 911 anytime you think you may need emergency care. For example, call if:  · You feel you cannot stop from hurting yourself or someone else.   Keep the numbers for these national suicide hotlines: 9-151-564-TALK (6-195-172-427.238.3513) and 3-173-OAXXNLL (4-422-685-236-837-7470). If you or someone you know talks about suicide or feeling hopeless, get help right away. Watch closely for changes in your health, and be sure to contact your doctor if:  · You have anxiety or fear that affects your life. · You have symptoms of anxiety that are new or different from those you had before. Where can you learn more? Go to http://yogi-ethan.info/. Enter P754 in the search box to learn more about \"Anxiety Disorder: Care Instructions. \"  Current as of: July 26, 2016  Content Version: 11.3  © 2669-2710 [employer] [place]. Care instructions adapted under license by [employer] (which disclaims liability or warranty for this information). If you have questions about a medical condition or this instruction, always ask your healthcare professional. [place] [address] any warranty or liability for your use of this information. Learning About Low-Carbohydrate Diets for Weight Loss  What is a low-carbohydrate diet? Low-carb diets avoid foods that are high in carbohydrate. These high-carb foods include pasta, bread, rice, cereal, fruits, and starchy vegetables. Instead, these diets usually have you eat foods that are high in fat and protein. Many people lose weight quickly on a low-carb diet. But the early weight loss is water. People on this diet often gain the weight back after they start eating carbs again. Not all diet plans are safe or work well. A lot of the evidence shows that low-carb diets aren't healthy. That's because these diets often don't include healthy foods like fruits and vegetables. Losing weight safely means balancing protein, fat, and carbs with every meal and snack. And low-carb diets don't always provide the vitamins, minerals, and fiber you need. If you have a serious medical condition, talk to your doctor before you try any diet.  These conditions include kidney disease, heart disease, type 2 diabetes, high cholesterol, and high blood pressure. If you are pregnant, it may not be safe for your baby if you are on a low-carb diet. How can you lose weight safely? You might have heard that a diet plan helped another person lose weight. But that doesn't mean that it will work for you. It is very hard to stay on a diet that includes lots of big changes in your eating habits. If you want to get to a healthy weight and stay there, making healthy lifestyle changes will often work better than dieting. These steps can help. · Make a plan for change. Work with your doctor to create a plan that is right for you. · See a dietitian. He or she can show you how to make healthy changes in your eating habits. · Manage stress. If you have a lot of stress in your life, it can be hard to focus on making healthy changes to your daily habits. · Track your food and activity. You are likely to do better at losing weight if you keep track of what you eat and what you do. Follow-up care is a key part of your treatment and safety. Be sure to make and go to all appointments, and call your doctor if you are having problems. It's also a good idea to know your test results and keep a list of the medicines you take. Where can you learn more? Go to http://yogi-ethan.info/. Enter A121 in the search box to learn more about \"Learning About Low-Carbohydrate Diets for Weight Loss. \"  Current as of: December 8, 2016  Content Version: 11.3  © 9943-0114 [email or web address]. Care instructions adapted under license by [employer] (which disclaims liability or warranty for this information). If you have questions about a medical condition or this instruction, always ask your healthcare professional. [address] any warranty or liability for your use of this information.

## 2017-06-29 NOTE — MR AVS SNAPSHOT
Visit Information Date & Time Provider Department Dept. Phone Encounter #  
 6/29/2017  2:00 PM Radha Henley MD [facility] [phone] [id number] Follow-up Instructions Return in about 4 weeks (around 7/27/2017), or if symptoms worsen or fail to improve, for Pain management. Upcoming Health Maintenance Date Due Hepatitis C Screening 1955 Pneumococcal 19-64 Highest Risk (1 of 3 - PCV13) 8/22/1975 DTaP/Tdap/Td series (1 - Tdap) 8/22/1977 FOBT Q 1 YEAR AGE 50-75 8/22/2006 ZOSTER VACCINE AGE 60> 8/22/2016 INFLUENZA AGE 9 TO ADULT 8/1/2017 Allergies as of 6/29/2017  Review Complete On: 6/29/2017 By: Zo Maher LPN Severity Noted Reaction Type Reactions Morphine  06/22/2010    Other (comments) Chest pain Current Immunizations  Never Reviewed No immunizations on file. Not reviewed this visit You Were Diagnosed With   
  
 Codes Comments Thoracic degenerative disc disease    -  Primary ICD-10-CM: M51.34 
ICD-9-CM: 722.51 Lumbar post-laminectomy syndrome     ICD-10-CM: M96.1 ICD-9-CM: 722.83 Anxiety     ICD-10-CM: F41.9 ICD-9-CM: 300.00 Hyperlipidemia, unspecified hyperlipidemia type     ICD-10-CM: E78.5 ICD-9-CM: 272.4 Morbid obesity, unspecified obesity type     ICD-10-CM: E66.01 
ICD-9-CM: 278.01 Vitals BP Pulse Temp Resp Height(growth percentile) Weight(growth percentile) 133/76 (BP 1 Location: Right arm, BP Patient Position: Sitting) (!) 56 96.9 °F (36.1 °C) (Oral) 24 5' 10\" (1.778 m) 270 lb (122.5 kg) SpO2 BMI Smoking Status 95% 38.74 kg/m2 Former Smoker BMI and BSA Data Body Mass Index Body Surface Area 38.74 kg/m 2 2.46 m 2 Preferred Pharmacy Pharmacy Name Phone [address] [facility] [phone] Your Updated Medication List  
  
   
 This list is accurate as of: 6/29/17  4:16 PM.  Always use your most recent med list.  
  
  
  
  
 ALPRAZolam 0.5 mg tablet Commonly known as:  Don  Take 1 Tab by mouth three (3) times daily as needed. Max Daily Amount: 1.5 mg.  
  
 CALCIUM+D PO Take  by mouth. CRESTOR 10 mg tablet Generic drug:  rosuvastatin Take 10 mg by mouth nightly. escitalopram oxalate 10 mg tablet Commonly known as:  Peng Rash Take 1 Tab by mouth daily. FISH OIL PO Take  by mouth. TORRES SEAL ROOT PO Take  by mouth. ibuprofen 800 mg tablet Commonly known as:  MOTRIN Take  by mouth. LEVITRA PO Take  by mouth.  
  
 magnesium 250 mg Tab Take  by mouth. MULTI VITAMIN PO Take  by mouth. oxyCODONE IR 10 mg Tab immediate release tablet Commonly known as:  Geovanny Laser Take 1 Tab by mouth every six (6) hours as needed. Max Daily Amount: 40 mg. VITAMIN B-12 PO Take  by mouth. VITAMIN D3 2,000 unit Tab Generic drug:  cholecalciferol (vitamin D3) Take  by mouth. Prescriptions Printed Refills  
 oxyCODONE IR (ROXICODONE) 10 mg tab immediate release tablet 0 Sig: Take 1 Tab by mouth every six (6) hours as needed. Max Daily Amount: 40 mg.  
 Class: Print Route: Oral  
 ALPRAZolam (XANAX) 0.5 mg tablet 0 Sig: Take 1 Tab by mouth three (3) times daily as needed. Max Daily Amount: 1.5 mg.  
 Class: Print Route: Oral  
  
Prescriptions Sent to Pharmacy Refills  
 escitalopram oxalate (LEXAPRO) 10 mg tablet 3 Sig: Take 1 Tab by mouth daily. Class: Normal  
 Pharmacy: [facility] [address] [place] [address] #: [phone] Route: Oral  
  
We Performed the Following REFERRAL TO PAIN MANAGEMENT [LFV633 Custom] Comments:  
 Please evaluate patient for chronic back pain, lumbar ddd. REFERRAL TO PSYCHIATRY [REF91 Custom] Comments:  
 Please evaluate patient for Anxiety. Follow-up Instructions Return in about 4 weeks (around 7/27/2017), or if symptoms worsen or fail to improve, for Pain management. Referral Information Referral ID Referred By Referred To  
  
 [id number] Vickie Fernandez Not Available Visits Status Start Date End Date 1 New Request 6/29/17 6/29/18 If your referral has a status of pending review or denied, additional information will be sent to support the outcome of this decision. Referral ID Referred By Referred To  
 [id number] Vickie Fernandez Not Available Visits Status Start Date End Date 1 New Request 6/29/17 6/29/18 If your referral has a status of pending review or denied, additional information will be sent to support the outcome of this decision. Patient Instructions Body Mass Index: Care Instructions Your Care Instructions Body mass index (BMI) can help you see if your weight is raising your risk for health problems. It uses a formula to compare how much you weigh with how tall you are. · A BMI lower than 18.5 is considered underweight. · A BMI between 18.5 and 24.9 is considered healthy. · A BMI between 25 and 29.9 is considered overweight. A BMI of 30 or higher is considered obese. If your BMI is in the normal range, it means that you have a lower risk for weight-related health problems. If your BMI is in the overweight or obese range, you may be at increased risk for weight-related health problems, such as high blood pressure, heart disease, stroke, arthritis or joint pain, and diabetes. If your BMI is in the underweight range, you may be at increased risk for health problems such as fatigue, lower protection (immunity) against illness, muscle loss, bone loss, hair loss, and hormone problems. BMI is just one measure of your risk for weight-related health problems.  You may be at higher risk for health problems if you are not active, you eat an unhealthy diet, or you drink too much alcohol or use tobacco products. Follow-up care is a key part of your treatment and safety. Be sure to make and go to all appointments, and call your doctor if you are having problems. It's also a good idea to know your test results and keep a list of the medicines you take. How can you care for yourself at home? · Practice healthy eating habits. This includes eating plenty of fruits, vegetables, whole grains, lean protein, and low-fat dairy. · If your doctor recommends it, get more exercise. Walking is a good choice. Bit by bit, increase the amount you walk every day. Try for at least 30 minutes on most days of the week. · Do not smoke. Smoking can increase your risk for health problems. If you need help quitting, talk to your doctor about stop-smoking programs and medicines. These can increase your chances of quitting for good. · Limit alcohol to 2 drinks a day for men and 1 drink a day for women. Too much alcohol can cause health problems. If you have a BMI higher than 25 · Your doctor may do other tests to check your risk for weight-related health problems. This may include measuring the distance around your waist. A waist measurement of more than 40 inches in men or 35 inches in women can increase the risk of weight-related health problems. · Talk with your doctor about steps you can take to stay healthy or improve your health. You may need to make lifestyle changes to lose weight and stay healthy, such as changing your diet and getting regular exercise. If you have a BMI lower than 18.5 · Your doctor may do other tests to check your risk for health problems. · Talk with your doctor about steps you can take to stay healthy or improve your health. You may need to make lifestyle changes to gain or maintain weight and stay healthy, such as getting more healthy foods in your diet and doing exercises to build muscle. Where can you learn more? Go to http://yogi-ethan.info/. Enter S176 in the search box to learn more about \"Body Mass Index: Care Instructions. \" Current as of: January 23, 2017 Content Version: 11.3 © 2006-2017 [employer]. Care instructions adapted under license by [employer] (which disclaims liability or warranty for this information). If you have questions about a medical condition or this instruction, always ask your healthcare professional. [address] any warranty or liability for your use of this information. Anxiety Disorder: Care Instructions Your Care Instructions Anxiety is a normal reaction to stress. Difficult situations can cause you to have symptoms such as sweaty palms and a nervous feeling. In an anxiety disorder, the symptoms are far more severe. Constant worry, muscle tension, trouble sleeping, nausea and diarrhea, and other symptoms can make normal daily activities difficult or impossible. These symptoms may occur for no reason, and they can affect your work, school, or social life. Medicines, counseling, and self-care can all help. Follow-up care is a key part of your treatment and safety. Be sure to make and go to all appointments, and call your doctor if you are having problems. It's also a good idea to know your test results and keep a list of the medicines you take. How can you care for yourself at home? · Take medicines exactly as directed. Call your doctor if you think you are having a problem with your medicine. · Go to your counseling sessions and follow-up appointments. · Recognize and accept your anxiety. Then, when you are in a situation that makes you anxious, say to yourself, \"This is not an emergency. I feel uncomfortable, but I am not in danger. I can keep going even if I feel anxious. \" · Be kind to your body: ¨ Relieve tension with exercise or a massage.  
¨ Get enough rest. 
 ¨ Avoid alcohol, caffeine, nicotine, and illegal drugs. They can increase your anxiety level and cause sleep problems. ¨ Learn and do relaxation techniques. See below for more about these techniques. · Engage your mind. Get out and do something you enjoy. Go to a funny movie, or take a walk or hike. Plan your day. Having too much or too little to do can make you anxious. · Keep a record of your symptoms. Discuss your fears with a good friend or family member, or join a support group for people with similar problems. Talking to others sometimes relieves stress. · Get involved in social groups, or volunteer to help others. Being alone sometimes makes things seem worse than they are. · Get at least 30 minutes of exercise on most days of the week to relieve stress. Walking is a good choice. You also may want to do other activities, such as running, swimming, cycling, or playing tennis or team sports. Relaxation techniques Do relaxation exercises 10 to 20 minutes a day. You can play soothing, relaxing music while you do them, if you wish. · Tell others in your house that you are going to do your relaxation exercises. Ask them not to disturb you. · Find a comfortable place, away from all distractions and noise. · Lie down on your back, or sit with your back straight. · Focus on your breathing. Make it slow and steady. · Breathe in through your nose. Breathe out through either your nose or mouth. · Breathe deeply, filling up the area between your navel and your rib cage. Breathe so that your belly goes up and down. · Do not hold your breath. · Breathe like this for 5 to 10 minutes. Notice the feeling of calmness throughout your whole body. As you continue to breathe slowly and deeply, relax by doing the following for another 5 to 10 minutes: · Tighten and relax each muscle group in your body. You can begin at your toes and work your way up to your head. · Imagine your muscle groups relaxing and becoming heavy. · Empty your mind of all thoughts. · Let yourself relax more and more deeply. · Become aware of the state of calmness that surrounds you. · When your relaxation time is over, you can bring yourself back to alertness by moving your fingers and toes and then your hands and feet and then stretching and moving your entire body. Sometimes people fall asleep during relaxation, but they usually wake up shortly afterward. · Always give yourself time to return to full alertness before you drive a car or do anything that might cause an accident if you are not fully alert. Never play a relaxation tape while you drive a car. When should you call for help? Call 911 anytime you think you may need emergency care. For example, call if: 
· You feel you cannot stop from hurting yourself or someone else. Keep the numbers for these national suicide hotlines: 8-305-978-TALK (2-148-729-591.377.6423) and 0-570-FFNZXSX (4-090-625-649.846.6973). If you or someone you know talks about suicide or feeling hopeless, get help right away. Watch closely for changes in your health, and be sure to contact your doctor if: 
· You have anxiety or fear that affects your life. · You have symptoms of anxiety that are new or different from those you had before. Where can you learn more? Go to http://yogi-ethan.info/. Enter P754 in the search box to learn more about \"Anxiety Disorder: Care Instructions. \" Current as of: July 26, 2016 Content Version: 11.3 © 9680-1691 [employer]. Care instructions adapted under license by [employer] (which disclaims liability or warranty for this information). If you have questions about a medical condition or this instruction, always ask your healthcare professional. [address] any warranty or liability for your use of this information. Learning About Low-Carbohydrate Diets for Weight Loss What is a low-carbohydrate diet? Low-carb diets avoid foods that are high in carbohydrate. These high-carb foods include pasta, bread, rice, cereal, fruits, and starchy vegetables. Instead, these diets usually have you eat foods that are high in fat and protein. Many people lose weight quickly on a low-carb diet. But the early weight loss is water. People on this diet often gain the weight back after they start eating carbs again. Not all diet plans are safe or work well. A lot of the evidence shows that low-carb diets aren't healthy. That's because these diets often don't include healthy foods like fruits and vegetables. Losing weight safely means balancing protein, fat, and carbs with every meal and snack. And low-carb diets don't always provide the vitamins, minerals, and fiber you need. If you have a serious medical condition, talk to your doctor before you try any diet. These conditions include kidney disease, heart disease, type 2 diabetes, high cholesterol, and high blood pressure. If you are pregnant, it may not be safe for your baby if you are on a low-carb diet. How can you lose weight safely? You might have heard that a diet plan helped another person lose weight. But that doesn't mean that it will work for you. It is very hard to stay on a diet that includes lots of big changes in your eating habits. If you want to get to a healthy weight and stay there, making healthy lifestyle changes will often work better than dieting. These steps can help. · Make a plan for change. Work with your doctor to create a plan that is right for you. · See a dietitian. He or she can show you how to make healthy changes in your eating habits. · Manage stress. If you have a lot of stress in your life, it can be hard to focus on making healthy changes to your daily habits. · Track your food and activity. You are likely to do better at losing weight if you keep track of what you eat and what you do. Follow-up care is a key part of your treatment and safety. Be sure to make and go to all appointments, and call your doctor if you are having problems. It's also a good idea to know your test results and keep a list of the medicines you take. Where can you learn more? Go to http://yogi-ethan.info/. Enter A121 in the search box to learn more about \"Learning About Low-Carbohydrate Diets for Weight Loss. \" Current as of: December 8, 2016 Content Version: 11.3 © 7756-2239 [employer]. Care instructions adapted under license by [employer] (which disclaims liability or warranty for this information). If you have questions about a medical condition or this instruction, always ask your healthcare professional. [address] any warranty or liability for your use of this information. Introducing [facility]! Dear Ame Zamora: 
Thank you for requesting a [employer] account. Our records indicate that you already have an active [employer] account. You can access your account anytime at [email or web address] Did you know that you can access your hospital and ER discharge instructions at any time in [employer]? You can also review all of your test results from your hospital stay or ER visit. Additional Information If you have questions, please visit the Frequently Asked Questions section of the [employer] website at [email or web address] Remember, [employer] is NOT to be used for urgent needs. For medical emergencies, dial 911. Now available from your iPhone and Android! Please provide this summary of care documentation to your next provider. Your primary care clinician is listed as Uzma Sands. If you have any questions after today's visit, please call [phone].

## 2017-07-03 NOTE — PROGRESS NOTES
Chief Complaint   Patient presents with   Tiara Vergara Medication Refill    Back Pain     HPI:  New patient here to establish care. This patient is a prior patient of Dr Duncan Payan who presents for medication refill    Hyperlipidemia: compliant with Crestor    chronic low back pain: Hx of prostate cancer on Levitra. He is s/p ALIF L4-5 11/4/2010 and more recently a B L4-5 lami with resection of epidural lipoma in 2015. He still has pain and numbness in the right leg to the ankle. The left leg pain is not as frequent as the right leg. His thoracic pain is his worst area of pain. Reported pain level is 10/10 without medication and 8/10 with oxycodone 10 mg 6 times per day given by Dr. Duncan Payan prior. He states the medication only takes the edge of his pain but it works. He is on disability. He was seeing Dr Lazaro Melara for some hand arthritis. He was seeing Dr. Daniel Garsia for muscle jerking, but has not seen him in a while. Anxiety: He tells me he takes xanax for his pain not for anixety. He tells me he does have some anxiety. Denies SI and HI        Review of Systems   Constitutional: Negative for chills, diaphoresis, fever, malaise/fatigue and weight loss. HENT: Negative for congestion and sore throat. Eyes: Negative for blurred vision, double vision and photophobia. Respiratory: Negative for cough, shortness of breath and wheezing. Cardiovascular: Negative for chest pain, palpitations and orthopnea. Gastrointestinal: Negative for abdominal pain, heartburn, nausea and vomiting. Genitourinary: Negative for dysuria, frequency and urgency. Musculoskeletal: Positive for back pain, joint pain, myalgias and neck pain. Negative for falls. Skin: Negative for itching and rash. Neurological: Positive for tingling and sensory change. Negative for dizziness, tremors, speech change, focal weakness, seizures, weakness and headaches.    Psychiatric/Behavioral: Negative for suicidal ideas. Allergies   Allergen Reactions    Morphine Other (comments)     Chest pain     Past Medical History:   Diagnosis Date    Anxiety     Arthritis     Back pain 6/22/2010    Cancer [facility])     Prostate    Chronic obstructive pulmonary disease ([facility] [address]     PT DENIES    Hernia of unspecified site of abdominal cavity without mention of obstruction or gangrene     Radiculopathy     Tobacco dependence      Past Surgical History:   Procedure Laterality Date    COLONOSCOPY N/A 2/9/2017    COLONOSCOPY / polypectomy [facility] by [facility]elsi Toro [facility] at [facility]OSCOPY    HX BACK SURGERY  11/10    HX HERNIA REPAIR  08/2007    [facility]    HX LUMBAR LAMINECTOMY  6/2015    L4, L5    HX OTHER SURGICAL  10/2008    Prostate     Family History   Problem Relation Age of Onset    Heart Disease Father     Cancer Other     Cancer Other      History   Smoking Status    Former Smoker    Packs/day: 1.00    Years: 35.00    Quit date: 10/1/2008   Smokeless Tobacco    Never Used     Social History     Social History    Marital status:      Spouse name: N/A    Number of children: N/A    Years of education: N/A     Occupational History    Not on file.      Social History Main Topics    Smoking status: Former Smoker     Packs/day: 1.00     Years: 35.00     Quit date: 10/1/2008    Smokeless tobacco: Never Used    Alcohol use 0.6 oz/week     1 Standard drinks or equivalent per week      Comment: twice per year    Drug use: No    Sexual activity: Yes     Partners: Female     Birth control/ protection: None     Other Topics Concern    Not on file     Social History Narrative         OBJECTIVE:  Visit Vitals    /76 (BP 1 Location: Right arm, BP Patient Position: Sitting)  Comment: XL cuff automated    Pulse (!) 56    Temp 96.9 °F (36.1 °C) (Oral)    Resp 24    Ht 5' 10\" (1.778 m)    Wt 270 lb (122.5 kg)    SpO2 95%    BMI 38.74 kg/m2     PHYSICAL EXAM:  Physical Exam   Constitutional: He is oriented to person, place, and time. He appears well-developed and well-nourished. HENT:   Head: Normocephalic and atraumatic. Right Ear: External ear normal.   Left Ear: External ear normal.   Eyes: Conjunctivae are normal. Pupils are equal, round, and reactive to light. Cardiovascular: Normal rate, regular rhythm and intact distal pulses. Pulmonary/Chest: Effort normal and breath sounds normal. No respiratory distress. He has no wheezes. He has no rales. Abdominal: Soft. Bowel sounds are normal. He exhibits no distension. There is no tenderness. There is no rebound and no guarding. Musculoskeletal: He exhibits tenderness. He exhibits no edema. Lymphadenopathy:     He has no cervical adenopathy. Neurological: He is alert and oriented to person, place, and time. Coordination normal.   Skin: Skin is warm. Psychiatric: He has a normal mood and affect. His behavior is normal.       ASSESSMENT/PLAN:  Sonia Storm was seen today for establish care, medication refill and back pain. Diagnoses and all orders for this visit:    Thoracic degenerative disc disease  -     Discontinue: oxyCODONE IR (ROXICODONE) 10 mg tab immediate release tablet; Take 1 Tab by mouth every six (6) hours as needed. Max Daily Amount: 40 mg.  -     REFERRAL TO PAIN MANAGEMENT  -     oxyCODONE IR (ROXICODONE) 10 mg tab immediate release tablet; Take 1 Tab by mouth every four (4) hours as needed. Max Daily Amount: 60 mg. Lumbar post-laminectomy syndrome  -     Discontinue: oxyCODONE IR (ROXICODONE) 10 mg tab immediate release tablet; Take 1 Tab by mouth every six (6) hours as needed. Max Daily Amount: 40 mg.  -     REFERRAL TO PAIN MANAGEMENT  -     oxyCODONE IR (ROXICODONE) 10 mg tab immediate release tablet; Take 1 Tab by mouth every four (4) hours as needed. Max Daily Amount: 60 mg. Anxiety  -     Discontinue: ALPRAZolam (XANAX) 0.5 mg tablet; Take 1 Tab by mouth three (3) times daily as needed.  Max Daily Amount: 1.5 mg.  - escitalopram oxalate (LEXAPRO) 10 mg tablet; Take 1 Tab by mouth daily.  -     REFERRAL TO PSYCHIATRY  -     TSH 3RD GENERATION; Future  -     ALPRAZolam (XANAX) 0.5 mg tablet; Take 1 Tab by mouth three (3) times daily as needed. Max Daily Amount: 1.5 mg. Hyperlipidemia, unspecified hyperlipidemia type  -     LIPID PANEL; Future    Morbid obesity, unspecified obesity type  -     METABOLIC PANEL, COMPREHENSIVE; Future    Hyperglycemia  -     HEMOGLOBIN A1C WITH EAG; Future      50% of this visit was spent in counseling and coordination of patient care. Also discussed dosing of the high risk medications he is on. I have reviewed . I have explained to patient that he is on quite a few high dose medications that I am not comfortable managing. We have discussed him going to psychiatry and pain management. He tells me he has been to 2 pain locations in past and is not interested in anyone changing his medications or paying a $40 copay. I have explained that his options are to go to pain management or find another PCP that is willing to manage his pain. Patient will think about it. I have discussed the diagnosis with the patient and the intended plan as seen in the above orders. The patient has received an after-visit summary and questions were answered concerning future plans. I have discussed medication side effects and warnings with the patient as well. I have reviewed the plan of care with the patient, accepted their input and they are in agreement with the treatment goals. Patient verbalizes understanding. Follow-up Disposition:  Return in about 4 weeks (around 7/27/2017), or if symptoms worsen or fail to improve, for Pain management.

## 2017-09-14 ENCOUNTER — HOSPITAL ENCOUNTER (OUTPATIENT)
Dept: GENERAL RADIOLOGY | Age: 62
Discharge: HOME | End: 2017-09-14
Payer: MEDICARE

## 2017-09-14 DIAGNOSIS — M54.16 LUMBAR RADICULOPATHY: ICD-10-CM

## 2017-09-14 PROCEDURE — 72100 X-RAY EXAM L-S SPINE 2/3 VWS: CPT

## 2018-02-16 ENCOUNTER — HOSPITAL ENCOUNTER (OUTPATIENT)
Dept: CT IMAGING | Age: 63
Discharge: HOME | End: 2018-02-16
Attending: PHYSICIAN ASSISTANT
Payer: MEDICARE

## 2018-02-16 DIAGNOSIS — K46.9 HERNIA, ABDOMINAL: ICD-10-CM

## 2018-02-16 LAB — CREAT UR-MCNC: 0.8 MG/DL (ref 0.6–1.3)

## 2018-02-16 PROCEDURE — 74011636320 HC RX REV CODE- 636/320

## 2018-02-16 PROCEDURE — 74177 CT ABD & PELVIS W/CONTRAST: CPT

## 2018-02-16 PROCEDURE — 82565 ASSAY OF CREATININE: CPT

## 2018-02-16 RX ADMIN — IOPAMIDOL 96 ML: 612 INJECTION, SOLUTION INTRAVENOUS at 10:23

## 2018-11-08 ENCOUNTER — HOSPITAL ENCOUNTER (OUTPATIENT)
Dept: NON INVASIVE DIAGNOSTICS | Age: 63
Discharge: HOME | End: 2018-11-08
Attending: FAMILY MEDICINE
Payer: MEDICARE

## 2018-11-08 VITALS
HEIGHT: 70 IN | SYSTOLIC BLOOD PRESSURE: 133 MMHG | WEIGHT: 270 LBS | DIASTOLIC BLOOD PRESSURE: 76 MMHG | BODY MASS INDEX: 38.65 KG/M2

## 2018-11-08 DIAGNOSIS — R00.2 PALPITATIONS: ICD-10-CM

## 2018-11-08 DIAGNOSIS — I44.7 LEFT BUNDLE BRANCH BLOCK (LBBB) ON ELECTROCARDIOGRAM: ICD-10-CM

## 2018-11-08 LAB
ECHO AO ROOT DIAM: 4.15 CM
ECHO LA AREA 4C: 20.3 CM2
ECHO LA VOL 2C: 79.41 ML (ref 18–58)
ECHO LA VOL 4C: 53.17 ML (ref 18–58)
ECHO LA VOL BP: 74.66 ML (ref 18–58)
ECHO LA VOL/BSA BIPLANE: 31.49 ML/M2 (ref 16–28)
ECHO LA VOLUME INDEX A2C: 33.49 ML/M2 (ref 16–28)
ECHO LA VOLUME INDEX A4C: 22.42 ML/M2 (ref 16–28)
ECHO LV INTERNAL DIMENSION DIASTOLIC: 4.63 CM (ref 4.2–5.9)
ECHO LV INTERNAL DIMENSION SYSTOLIC: 3.13 CM
ECHO LV IVSD: 1.42 CM (ref 0.6–1)
ECHO LV MASS 2D: 317.8 G (ref 88–224)
ECHO LV MASS INDEX 2D: 134 G/M2 (ref 49–115)
ECHO LV POSTERIOR WALL DIASTOLIC: 1.43 CM (ref 0.6–1)
ECHO LVOT DIAM: 2.23 CM
ECHO LVOT PEAK GRADIENT: 4.6 MMHG
ECHO LVOT PEAK VELOCITY: 107.51 CM/S
ECHO LVOT VTI: 24.43 CM
ECHO MV A VELOCITY: 82.74 CM/S
ECHO MV E DECELERATION TIME (DT): 260.9 MS
ECHO MV E VELOCITY: 0.67 CM/S
ECHO MV E/A RATIO: 0.01
ECHO PULMONARY ARTERY SYSTOLIC PRESSURE (PASP): 37 MMHG
ECHO TV REGURGITANT MAX VELOCITY: 290.42 CM/S
ECHO TV REGURGITANT PEAK GRADIENT: 33.7 MMHG
STRESS TARGET HR: 134 BPM

## 2018-11-08 PROCEDURE — C8929 TTE W OR WO FOL WCON,DOPPLER: HCPCS

## 2018-11-08 PROCEDURE — 78451 HT MUSCLE IMAGE SPECT SING: CPT

## 2018-11-08 PROCEDURE — 74011250636 HC RX REV CODE- 250/636: Performed by: FAMILY MEDICINE

## 2018-11-08 PROCEDURE — A9500 TC99M SESTAMIBI: HCPCS

## 2018-11-08 PROCEDURE — 93226 XTRNL ECG REC<48 HR SCAN A/R: CPT

## 2018-11-08 RX ORDER — SODIUM CHLORIDE 9 MG/ML
250 INJECTION, SOLUTION INTRAVENOUS ONCE
Status: DISPENSED | OUTPATIENT
Start: 2018-11-08 | End: 2018-11-08

## 2018-11-08 RX ADMIN — PERFLUTREN 2 ML: 6.52 INJECTION, SUSPENSION INTRAVENOUS at 09:55

## 2018-11-28 ENCOUNTER — OFFICE VISIT (OUTPATIENT)
Dept: CARDIOLOGY CLINIC | Age: 63
End: 2018-11-28

## 2018-11-28 VITALS
BODY MASS INDEX: 38.37 KG/M2 | OXYGEN SATURATION: 97 % | DIASTOLIC BLOOD PRESSURE: 62 MMHG | HEIGHT: 70 IN | WEIGHT: 268 LBS | SYSTOLIC BLOOD PRESSURE: 112 MMHG | HEART RATE: 61 BPM

## 2018-11-28 DIAGNOSIS — R07.9 CHEST PAIN, UNSPECIFIED TYPE: Primary | ICD-10-CM

## 2018-11-28 PROBLEM — E66.01 SEVERE OBESITY (HCC): Status: ACTIVE | Noted: 2018-11-28

## 2018-11-28 RX ORDER — TRIAMCINOLONE ACETONIDE 1 MG/G
CREAM TOPICAL
COMMUNITY
Start: 2018-09-25 | End: 2020-11-03

## 2018-11-28 RX ORDER — ALPRAZOLAM 0.25 MG/1
0.25 TABLET ORAL
COMMUNITY
End: 2018-11-28 | Stop reason: ALTCHOICE

## 2018-11-28 NOTE — PROGRESS NOTES
Charlie Vazquez    Chief Complaint   Patient presents with    New Patient     ref by PCP for palps & pressure     HPI    Charlie Vazquez is a 59 y.o. with no known coronary disease, who is here for palpitations. As you know, So Kamara is such a pleasant gentleman who has noticed palpitations for some time now. He notices occasional skipping, racing, and sensation of his heart beat. He had a 48 hour holter monitor that did show frequent PVCs but luckily no significantly sustained tachy or bradyarrhythmias. He had a 2D echocardiogram which I reviewed with him as well. It showed normal LV function, no significant valvular pathology but did note mild diastolic dysfunction. He apparently tried to have a pharm nuc stress test, but it was aborted as he couldn't lie down flat for the study due to his back pain. Besides the palpitations, he has no chest pain, no SOB/ PERES, no LE edema. He did smoke but quit many years ago. He has a positive family history for premature CAD- his father  in his 45s of what was believed to be a heart attack. Past Medical History:   Diagnosis Date    Anxiety     Arthritis     Back pain 2010    Cancer [facility])     Prostate    Chronic obstructive pulmonary disease (HCC)     PT DENIES    Hernia of unspecified site of abdominal cavity without mention of obstruction or gangrene     Radiculopathy     Tobacco dependence        Past Surgical History:   Procedure Laterality Date    HX BACK SURGERY  11/10    H[facility] HERNIA REPAIR  2007    [facility]    HX LUMBAR LAMINECTOMY  2015    L4, L5    HX OTHER SURGICAL  10/2008    Prostate       Current Outpatient Medications   Medication Sig Dispense Refill    triamcinolone acetonide (KENALOG) 0.1 % topical cream       TORRES SEAL ROOT PO Take  by mouth.  magnesium 250 mg tab Take  by mouth.  cholecalciferol, vitamin D3, (VITAMIN D3) 2,000 unit tab Take  by mouth.       escitalopram oxalate (LEXAPRO) 10 mg tablet Take 1 Tab by mouth daily. 30 Tab 3    oxyCODONE IR (ROXICODONE) 10 mg tab immediate release tablet Take 1 Tab by mouth every four (4) hours as needed. Max Daily Amount: 60 mg. 180 Tab 0    ibuprofen (MOTRIN) 800 mg tablet Take  by mouth.  MULTIVIT &MINERALS/FERROUS FUM (MULTI VITAMIN PO) Take  by mouth.  rosuvastatin (CRESTOR) 10 mg tablet Take 10 mg by mouth nightly.  CALCIUM CARBONATE/VITAMIN D3 (CALCIUM+D PO) Take  by mouth.  CYANOCOBALAMIN, VITAMIN B-12, (VITAMIN B-12 PO) Take  by mouth.  DOCOSAHEXANOIC ACID/EPA (FISH OIL PO) Take  by mouth. Allergies   Allergen Reactions    Morphine Other (comments)     Chest pain       Social History     Socioeconomic History    Marital status:      Spouse name: Not on file    Number of children: Not on file    Years of education: Not on file    Highest education level: Not on file   Social Needs    Financial resource strain: Not on file    Food insecurity - worry: Not on file    Food insecurity - inability: Not on file    Transportation needs - medical: Not on file   [employer] needs - non-medical: Not on file   Occupational History    Not on file   Tobacco Use    Smoking status: Former Smoker     Packs/day: 1.00     Years: 35.00     Pack years: 35.00     Last attempt to quit: 10/1/2008     Years since quitting: 10.1    Smokeless tobacco: Never Used   Substance and Sexual Activity    Alcohol use: Yes     Alcohol/week: 0.6 oz     Types: 1 Standard drinks or equivalent per week     Comment: twice per year    Drug use: No    Sexual activity: Yes     Partners: Female     Birth control/protection: None   Other Topics Concern    Not on file   Social History Narrative    Not on file        The patient has a family history of    Review of Systems    14 pt Review of Systems is negative unless otherwise mentioned in the HPI.     Wt Readings from Last 3 Encounters:   11/28/18 121.6 kg (268 lb)   11/08/18 122.5 kg (270 lb)   06/29/17 122.5 kg (270 lb)     Temp Readings from Last 3 Encounters:   06/29/17 96.9 °F (36.1 °C) (Oral)   02/09/17 97.7 °F (36.5 °C)   11/24/15 96.6 °F (35.9 °C)     BP Readings from Last 3 Encounters:   11/28/18 112/62   11/08/18 133/76   06/29/17 133/76     Pulse Readings from Last 3 Encounters:   11/28/18 61   06/29/17 (!) 56   02/09/17 (!) 59       11/08/18   ECHO ADULT COMPLETE 11/08/2018 11/8/2018    Narrative · Technically difficult study with poor endocardial visualization. Definity contrast was given to enhance imaging. · Estimated left ventricular ejection fraction is 56 - 60%. Visually   measured ejection fraction. Left ventricular moderate concentric   hypertrophy. Normal left ventricular wall motion, no regional wall motion   abnormality noted. Mild (grade 1) left ventricular diastolic dysfunction. · There is no evidence of pulmonary hypertension. PASP 37mmHg  · Right atrium is dilated. Right ventricle is dilated. · Mitral annular calcification. Trace mitral valve regurgitation. Signed by: Jeff Linda MD       Physical Exam:    Visit Vitals  /62   Pulse 61   Ht 5' 10\" (1.778 m)   Wt 121.6 kg (268 lb)   SpO2 97%   BMI 38.45 kg/m²      Physical Exam   Constitutional: He is oriented to person, place, and time. He appears well-developed and well-nourished. No distress. HENT:   Head: Normocephalic and atraumatic. Eyes: EOM are normal. Pupils are equal, round, and reactive to light. No scleral icterus. Neck: No JVD present. Cardiovascular: Normal rate, regular rhythm, normal heart sounds and intact distal pulses. Exam reveals no gallop and no friction rub. No murmur heard. Pulmonary/Chest: Effort normal and breath sounds normal. No respiratory distress. He has no wheezes. He has no rales. He exhibits no tenderness. Abdominal: Soft. Bowel sounds are normal.   Musculoskeletal: He exhibits no edema. Neurological: He is alert and oriented to person, place, and time.    Skin: Skin is warm and dry. No rash noted. He is not diaphoretic. Psychiatric: He has a normal mood and affect. EKG today shows: NSR, LBBB with associated nonspecific ST segment abnormalities    Impression and Plan:  Aiden Acevedo is a 59 y.o. with:    1.) Palpitations from PVCs  2.) LBBB, known  3.) Normal LV function, known  4.) Mild diastolic dysfunction, known/ stable  5.) Signs and symptoms suggestive of sleep disordered breathing    1.) No further testing needed at this time  2.) No medication changes at this time  3.) Can consider beta blocker in future if he becomes bothered by PVCs  4.) Did mention possibly checking electrolytes (I see he takes Mg) and screen for RAFAEL, as can increase frequency of PVCs  5.) RTC prn    I spent significant time going over Mr. James Carlos most recent testing. With a structural normal heart, his PVCs are benign. He is not so bothered by him (and not affecting LV function) so did not feel BB was warranted at this time. I am happy to see him again in the future at your discretion. Thank you for allowing me to participate in the care of your patient, please do not hesitate to call with questions or concerns.     Follow-up Disposition: Not on File    Roslyn Sears, DO

## 2018-12-07 ENCOUNTER — DOCUMENTATION ONLY (OUTPATIENT)
Dept: CARDIOLOGY CLINIC | Age: 63
End: 2018-12-07

## 2018-12-07 NOTE — PROGRESS NOTES
Office note and EKG from 11/28/18 faxed through [address] [facility] to Dr. Nessa Lopez.

## 2019-05-01 ENCOUNTER — OFFICE VISIT (OUTPATIENT)
Dept: UROLOGY | Age: 64
End: 2019-05-01

## 2019-05-01 VITALS
BODY MASS INDEX: 38.51 KG/M2 | OXYGEN SATURATION: 94 % | WEIGHT: 269 LBS | HEIGHT: 70 IN | DIASTOLIC BLOOD PRESSURE: 76 MMHG | SYSTOLIC BLOOD PRESSURE: 142 MMHG | HEART RATE: 57 BPM

## 2019-05-01 DIAGNOSIS — C61 CARCINOMA OF PROSTATE (HCC): Primary | ICD-10-CM

## 2019-05-01 DIAGNOSIS — Z85.46 HISTORY OF PROSTATE CANCER: ICD-10-CM

## 2019-05-01 DIAGNOSIS — R35.1 NOCTURIA: ICD-10-CM

## 2019-05-01 DIAGNOSIS — R35.0 FREQUENT URINATION: ICD-10-CM

## 2019-05-01 LAB
BILIRUB UR QL STRIP: NEGATIVE
GLUCOSE UR-MCNC: NEGATIVE MG/DL
KETONES P FAST UR STRIP-MCNC: NEGATIVE MG/DL
PH UR STRIP: 6 [PH] (ref 4.6–8)
PROT UR QL STRIP: NEGATIVE
SP GR UR STRIP: 1.02 (ref 1–1.03)
UA UROBILINOGEN AMB POC: NORMAL (ref 0.2–1)
URINALYSIS CLARITY POC: CLEAR
URINALYSIS COLOR POC: YELLOW
URINE BLOOD POC: NORMAL
URINE LEUKOCYTES POC: NEGATIVE
URINE NITRITES POC: NEGATIVE

## 2019-05-01 RX ORDER — DULOXETIN HYDROCHLORIDE 30 MG/1
60 CAPSULE, DELAYED RELEASE ORAL DAILY
COMMUNITY
End: 2022-06-16

## 2019-05-01 RX ORDER — LANOLIN ALCOHOL/MO/W.PET/CERES
1 CREAM (GRAM) TOPICAL DAILY
COMMUNITY
End: 2020-11-03

## 2019-05-01 NOTE — PATIENT INSTRUCTIONS
Frequent Urination: Care Instructions  Your Care Instructions  An urge to urinate frequently but usually passing only small amounts of urine is a common symptom of urinary problems, such as urinary tract infections. The bladder may become inflamed. This can cause the urge to urinate. You may try to urinate more often than usual to try to soothe that urge. Frequent urination also may be caused by sexually transmitted infections (STIs) or kidney stones. Or it may happen when something irritates the tube that carries urine from the bladder to the outside of the body (urethra). It may also be a sign of diabetes. The cause may be hard to find. You may need tests. Follow-up care is a key part of your treatment and safety. Be sure to make and go to all appointments, and call your doctor if you are having problems. It's also a good idea to know your test results and keep a list of the medicines you take. How can you care for yourself at home? · Drink extra water for the next day or two. This will help make the urine less concentrated. (If you have kidney, heart, or liver disease and have to limit fluids, talk with your doctor before you increase the amount of fluids you drink.)  · Avoid drinks that are carbonated or have caffeine. They can irritate the bladder. For women:  · Urinate right after you have sex. · After you go to the bathroom, wipe from front to back. · Avoid douches, bubble baths, and feminine hygiene sprays. And avoid other feminine hygiene products that have deodorants. When should you call for help? Call your doctor now or seek immediate medical care if:    · You have new symptoms, such as fever, nausea, or vomiting.     · You have new or worse symptoms of a urinary problem. For example:  ? You have blood or pus in your urine. ? You have chills or body aches. ? It hurts to urinate. ? You have groin or belly pain. ? You have pain in your back just below your rib cage (the flank area).  Watch closely for changes in your health, and be sure to contact your doctor if you feel thirstier than usual.  Where can you learn more? Go to http://yogi-ethan.info/. Enter 137 [id number] in the search box to learn more about \"Frequent Urination: Care Instructions. \"  Current as of: March 20, 2018  Content Version: 11.9  © 1517-8774 [employer]. Care instructions adapted under license by [employer] (which disclaims liability or warranty for this information). If you have questions about a medical condition or this instruction, always ask your healthcare professional. [place] [address] any warranty or liability for your use of this information.

## 2019-05-01 NOTE — PROGRESS NOTES
Mr. Dexter Perkins has a reminder for a \"due or due soon\" health maintenance. I have asked that he contact his primary care provider for follow-up on this health maintenance.

## 2019-05-02 LAB
CREAT SERPL-MCNC: 0.92 MG/DL (ref 0.76–1.27)
PSA SERPL-MCNC: 0.4 NG/ML (ref 0–4)

## 2019-05-02 NOTE — PROGRESS NOTES
Mala Aguayo 59 y.o. male                                         Prostate Car[address] [facility] [address] Prostatectomy 2008 (Dr. Indu Arango)     Eddi De La Cruz seen today for evaluation of recent rise in  PSA level status post radical prostatectomy 2008    Patient has no irritative or obstructive voiding symptoms no complaints regarding urination at this time    PVR 38 cc in 2019      PSA less than 0.1 2012  PSA 0.3 in 2019    Review of Systems:   CNS: Seizure syncope headaches dizziness or visual changes  Respiratory: No wheezing shortness of breath chest pain or coughing  Cardiovascular: No angina no palpitations  Intestinal: No dyspepsia diarrhea or constipation  Urinary: No urgency frequency dysuria or gross hematuria  Skeletal: Lumbar disc disease aches and pains large joint arthritis chronic low back pain  Endocrine: No diabetes or thyroid disease  Other:    Allergies: Allergies   Allergen Reactions    Morphine Other (comments)     Chest pain      Medications:    Current Outpatient Medications   Medication Sig Dispense Refill    glucosamine-chondroitin (ARTHX) 500-400 mg cap Take 1 Cap by mouth daily.  DULoxetine (CYMBALTA) 30 mg capsule Take 30 mg by mouth daily.  menthol/camphor (BIOFREEZE EX) by Apply Externally route.  triamcinolone acetonide (KENALOG) 0.1 % topical cream       magnesium 250 mg tab Take  by mouth.  cholecalciferol, vitamin D3, (VITAMIN D3) 2,000 unit tab Take  by mouth.  oxyCODONE IR (ROXICODONE) 10 mg tab immediate release tablet Take 1 Tab by mouth every four (4) hours as needed. Max Daily Amount: 60 mg. 180 Tab 0    ibuprofen (MOTRIN) 800 mg tablet Take  by mouth.  MULTIVIT &MINERALS/FERROUS FUM (MULTI VITAMIN PO) Take  by mouth.  rosuvastatin (CRESTOR) 10 mg tablet Take 10 mg by mouth nightly.  CALCIUM CARBONATE/VITAMIN D3 (CALCIUM+D PO) Take  by mouth.  DOCOSAHEXANOIC ACID/EPA (FISH OIL PO) Take  by mouth.  TORRES SEAL ROOT PO Take  by mouth.  escitalopram oxalate (LEXAPRO) 10 mg tablet Take 1 Tab by mouth daily. 30 Tab 3    CYANOCOBALAMIN, VITAMIN B-12, (VITAMIN B-12 PO) Take  by mouth.            Past Medical History:   Diagnosis Date    Anxiety     Arthritis     Back pain 6/22/2010    Cancer [facility])     Prostate    Chronic obstructive pulmonary disease (HCC)     PT DENIES    Hernia of unspecified site of abdominal cavity without mention of obstruction or gangrene     Radiculopathy     Tobacco dependence       Past Surgical History:   Procedure Laterality Date    COLONOSCOPY N/A 2/9/2017    COLONOSCOPY / polypect[facility]i [facility]erson [facility]COPY    HX BACK SURGERY  11/10    HX HERNIA REPAIR  08/2007    [facility]    HX LUMBAR LAMINECTOMY  6/2015    L4, L5    HX OTHER SURGICAL  10/2008    Prostate    HX PROSTATECTOMY       Social History     Socioeconomic History    Marital status:      Spouse name: Not on file    Number of children: Not on file    Years of education: Not on file    Highest education level: Not on file   Occupational History    Not on file   Social Needs    Financial resource strain: Not on file    Food insecurity:     Worry: Not on file     Inability: Not on file    Transportation needs:     Medical: Not on file     Non-medical: Not on file   Tobacco Use    Smoking status: Former Smoker     Packs/day: 1.00     Years: 35.00     Pack years: 35.00     Last attempt to quit: 10/1/2008     Years since quitting: 10.5    Smokeless tobacco: Never Used   Substance and Sexual Activity    Alcohol use: Not Currently     Alcohol/week: 0.6 oz     Types: 1 Standard drinks or equivalent per week     Comment: twice per year    Drug use: No    Sexual activity: Yes     Partners: Female     Birth control/protection: None   Lifestyle    Physical activity:     Days per week: Not on file     Minutes per session: Not on file    Stress: Not on file   Relationships    Social connections:     Talks on phone: Not on file     Gets together: Not on file     Attends [employer] service: Not on file     Active member of club or organization: Not on file     Attends meetings of clubs or organizations: Not on file     Relationship status: Not on file    Intimate partner violence:     Fear of current or ex partner: Not on file     Emotionally abused: Not on file     Physically abused: Not on file     Forced sexual activity: Not on file   Other Topics Concern    Not on file   Social History Narrative    Not on file      Family History   Problem Relation Age of Onset    Heart Disease Father     Stroke Father     Cancer Mother     Cancer Brother     Cancer Other     Cancer Other         Physical Examination: Well-nourished mature male in no apparent distress    Abdomen is nontender no palpable masses no organomegaly  Back-no percussion CVA tenderness on either side  No inguinal hernia or adenopathy  Penis is normal  Testes are normal in size shape and consistency bilaterally-no epididymal induration or tenderness on either side  Spermatic cords are palpably normal bilaterally  Scrotum is normal  OSWALDO no palpable masses induration or nodularity  No rectal masses tenderness or induration    Urinalysis: Negative dipstick/nitrite negative negative heme      PVR today 38 cc        Impression: Prostate carcinoma status post radical prostatectomy 2008-rising PSA      Plan: PSA creatinine today    Described discussed prospect of androgen ablation therapy-bone scan and CT scan imaging of abdomen pelvis metastatic assessment if PSA rise this    RTC 3 weeks    Blue Price MD  -electronically signed-    PLEASE NOTE:  This document has been produced using voice recognition software. Unrecognized errors in transcription may be present.

## 2019-06-03 ENCOUNTER — OFFICE VISIT (OUTPATIENT)
Dept: UROLOGY | Age: 64
End: 2019-06-03

## 2019-06-03 VITALS
HEART RATE: 56 BPM | SYSTOLIC BLOOD PRESSURE: 110 MMHG | BODY MASS INDEX: 38.65 KG/M2 | WEIGHT: 270 LBS | OXYGEN SATURATION: 94 % | DIASTOLIC BLOOD PRESSURE: 70 MMHG | HEIGHT: 70 IN

## 2019-06-03 DIAGNOSIS — C61 PROSTATE CA (HCC): ICD-10-CM

## 2019-06-03 DIAGNOSIS — R97.20 PSA ELEVATION: Primary | ICD-10-CM

## 2019-06-03 LAB
BILIRUB UR QL STRIP: NEGATIVE
GLUCOSE UR-MCNC: NEGATIVE MG/DL
KETONES P FAST UR STRIP-MCNC: NEGATIVE MG/DL
PH UR STRIP: 7.5 [PH] (ref 4.6–8)
PROT UR QL STRIP: NEGATIVE
SP GR UR STRIP: 1.02 (ref 1–1.03)
UA UROBILINOGEN AMB POC: NORMAL (ref 0.2–1)
URINALYSIS CLARITY POC: CLEAR
URINALYSIS COLOR POC: YELLOW
URINE BLOOD POC: NEGATIVE
URINE LEUKOCYTES POC: NEGATIVE
URINE NITRITES POC: NEGATIVE

## 2019-06-03 NOTE — PROGRESS NOTES
Mr. Pilar Ugalde has a reminder for a \"due or due soon\" health maintenance. I have asked that he contact his primary care provider for follow-up on this health maintenance.

## 2019-06-03 NOTE — PATIENT INSTRUCTIONS
Prostate Biopsy: About This Test  What is it? A prostate biopsy is a type of test. Your doctor takes small tissue samples from your prostate gland. Then another doctor looks at the tissue under a microscope to see if there are cancer cells. This test is done by a doctor who specializes in men's genital and urinary problems (urologist). It can be done in your doctor's office, a day surgery clinic, or a hospital operating room. To get the tissue samples from the prostate, the doctor inserts a thin needle through the rectum, the urethra, or the area between the anus and scrotum (perineum). The most common method is through the rectum. Your doctor may use ultrasound to help guide the needle. Why is this test done? You may need a prostate biopsy if your doctor found something of concern during a digital rectal exam. Or you may need it if a blood test showed a high level of prostate-specific antigen (PSA). A biopsy can help find out if you have prostate cancer. How can you prepare for this test?  Before you have a prostate biopsy, tell your doctor if you are taking any medicines or using any herbal supplements, or if you are allergic to any medicines. And tell your doctor if you have had bleeding problems, or you take aspirin or some other blood thinner. What happens before the test?  · You may need to have an enema before the test.  · You will need to take off all or most of your clothes. You will be given a cloth or paper gown to use during the test.  · You may be given a sedative through a vein (IV) in your arm. The sedative will help you relax and stay still. What happens during the test?  Some men have an MRI of the prostate before their biopsy. This helps to find the areas in the prostate to take biopsy samples. If you have an MRI, your doctor will use ultrasound and the MRI results to find the areas to biopsy.   Through the rectum  · You may be asked to kneel, lie on your side, or lie on your back.  · Your doctor may inject an anesthetic around the prostate to numb the area before the sample is taken. · Ultrasound is often used to guide the needle to the correct spot. · Your doctor may choose to use a needle guide for the biopsy. He or she will attach the guide to a finger. Your doctor will insert the finger into your rectum. · The needle will enter the prostate and take 6 to 12 samples. Through the urethra  · You will lie on your back. Your feet will rest in stirrups. · You will get anesthesia. The anesthesia may make you sleep. Or it may just numb the area being worked on.  · Your doctor will insert a lighted scope (cystoscope) into your urethra. The scope allows your doctor to look directly at the prostate. Your doctor will pass a cutting loop through the scope to remove samples of prostate tissue. Through the perineum  · You will lie on an exam table either on your side or on your back with your knees bent. You will get anesthesia. The anesthesia may make you sleep. Or it may just numb the area being worked on.  · Your doctor will make a small cut in your perineum. Then he or she will insert a finger into the rectum to hold the prostate. · Your doctor will then insert the needle through the cut and into the prostate. · The needle collects samples of tissue and is then pulled out. What else should you know about this test?  · A prostate biopsy has a slight risk of causing problems such as infection or bleeding. · If the biopsy went through your rectum, you may have a small amount of bleeding from your rectum for 2 to 3 days after the biopsy. · You may have a little pain in your pelvic area. You may also have a little blood in your urine for 1 to 5 days. · You may have some blood in your semen for a week or longer. How long will the test take? This test will take about 15 to 45 minutes.   What happens after the test?  Your doctor will tell you what to expect and watch for after your test. In general:  · If you have anesthesia that makes you sleep, you will be in a recovery room for a few hours. You will need someone to drive you home. You may feel tired for the rest of the day. · You will probably be able to go home right away. · If your doctor had you stop taking any medicine for the biopsy, ask him or her when you can start taking it again. If you were given antibiotics, take them as directed. · Do not do heavy work or exercise for 4 hours after the test.  · Your doctor will tell you how long it may take to get your results back. Follow-up care is a key part of your treatment and safety. Be sure to make and go to all appointments, and call your doctor if you are having problems. It's also a good idea to keep a list of the medicines you take. Ask your doctor when you can expect to have your test results. Where can you learn more? Go to http://yogi-ethan.info/. Enter Y504 in the search box to learn more about \"Prostate Biopsy: About This Test.\"  Current as of: March 27, 2018  Content Version: 11.9  © 2405-0805 [employer]. Care instructions adapted under license by [employer] (which disclaims liability or warranty for this information). If you have questions about a medical condition or this instruction, always ask your healthcare professional. [address] any warranty or liability for your use of this information.

## 2019-06-04 NOTE — PROGRESS NOTES
Hunterh Dust 59 y.o. male                                                            Prostate Carcinoma [address] [facility] [address] Prostatectomy 2008 (Dr. Sea Jones)  Rosevelt Goodell seen today for evaluation of recent rise in  PSA level status post radical prostatectomy 2008/PSA 34.7     Patient has no irritative or obstructive voiding symptoms no complaints regarding urination at this time     PSA 0.1 in January 2010  PSA 0.1 in August 2011  PSA 0.1 2012  PSA 0.3 in 2019   PSA 0.4 on 2 May 2019    PVR 38 cc in January 2019       Review of Systems:   CNS: Seizure syncope headaches dizziness or visual changes  Respiratory: No wheezing shortness of breath chest pain or coughing  Cardiovascular: No angina no palpitations  Intestinal: No dyspepsia diarrhea or constipation  Urinary: No urgency frequency dysuria or gross hematuria  Skeletal: Lumbar disc disease aches and pains large joint arthritis chronic low back pain  Endocrine: No diabetes or thyroid disease  Other:    Allergies: Allergies   Allergen Reactions    Morphine Other (comments)     Chest pain      Medications:    Current Outpatient Medications   Medication Sig Dispense Refill    glucosamine-chondroitin (ARTHX) 500-400 mg cap Take 1 Cap by mouth daily.  DULoxetine (CYMBALTA) 30 mg capsule Take 30 mg by mouth daily.  menthol/camphor (BIOFREEZE EX) by Apply Externally route.  triamcinolone acetonide (KENALOG) 0.1 % topical cream       magnesium 250 mg tab Take  by mouth.  cholecalciferol, vitamin D3, (VITAMIN D3) 2,000 unit tab Take  by mouth.  oxyCODONE IR (ROXICODONE) 10 mg tab immediate release tablet Take 1 Tab by mouth every four (4) hours as needed. Max Daily Amount: 60 mg. 180 Tab 0    ibuprofen (MOTRIN) 800 mg tablet Take  by mouth.  MULTIVIT &MINERALS/FERROUS FUM (MULTI VITAMIN PO) Take  by mouth.  rosuvastatin (CRESTOR) 10 mg tablet Take 10 mg by mouth nightly.  CALCIUM CARBONATE/VITAMIN D3 (CALCIUM+D PO) Take  by mouth.  DOCOSAHEXANOIC ACID/EPA (FISH OIL PO) Take  by mouth.  TORRES SEAL ROOT PO Take  by mouth.  escitalopram oxalate (LEXAPRO) 10 mg tablet Take 1 Tab by mouth daily. 30 Tab 3    CYANOCOBALAMIN, VITAMIN B-12, (VITAMIN B-12 PO) Take  by mouth.            Past Medical History:   Diagnosis Date    Anxiety     Arthritis     Back pain 6/22/2010    Cancer [facility])     Prostate    Chronic obstructive pulmonary disease (HCC)     PT DENIES    Hernia of unspecified site of abdominal cavity without mention of obstruction or gangrene     Radiculopathy     Tobacco dependence       Past Surgical History:   Procedure Laterality Date    COLONOSCOPY N/A 2/9/2017    COLONOSCOPY / polypect[facility] END[facility]    HX BACK SURGERY  11/10    HX HERNIA REPAIR  08/2007    [facility]    HX LUMBAR LAMINECTOMY  6/2015    L4, L5    HX OTHER SURGICAL  10/2008    Prostate    HX PROSTATECTOMY       Social History     Socioeconomic History    Marital status:      Spouse name: Not on file    Number of children: Not on file    Years of education: Not on file    Highest education level: Not on file   Occupational History    Not on file   Social Needs    Financial resource strain: Not on file    Food insecurity:     Worry: Not on file     Inability: Not on file    Transportation needs:     Medical: Not on file     Non-medical: Not on file   Tobacco Use    Smoking status: Former Smoker     Packs/day: 1.00     Years: 35.00     Pack years: 35.00     Last attempt to quit: 10/1/2008     Years since quitting: 10.6    Smokeless tobacco: Never Used   Substance and Sexual Activity    Alcohol use: Not Currently     Alcohol/week: 0.6 oz     Types: 1 Standard drinks or equivalent per week     Comment: twice per year    Drug use: No    Sexual activity: Yes     Partners: Female     Birth control/protection: None   Lifestyle    Physical activity:     Days per week: Not on file     Minutes per session: Not on file    Stress: Not on file   Relationships    Social connections:     Talks on phone: Not on file     Gets together: Not on file     Attends Alevism service: Not on file     Active member of club or organization: Not on file     Attends meetings of clubs or organizations: Not on file     Relationship status: Not on file    Intimate partner violence:     Fear of current or ex partner: Not on file     Emotionally abused: Not on file     Physically abused: Not on file     Forced sexual activity: Not on file   Other Topics Concern    Not on file   Social History Narrative    Not on file      Family History   Problem Relation Age of Onset    Heart Disease Father     Stroke Father     Cancer Mother     Cancer Brother     Cancer Other     Cancer Other         Physical Examination: Well-nourished mature male in no apparent distress     Urinalysis: Negative dipstick/nitrite negative/heme-negative        Impression: Prostate carcinoma status post da Sumit prostatectomy 2008-rising PSA      Plan: PET CT scan    Described discussed prospect of XRT to prostate bed suspecting local recurrence of prostate carcinoma    RTC 3 weeks       More than 1/2 of this 25 minute visit was spent in counselling and coordination of care, as described above. Blue Price MD  -electronically signed-    PLEASE NOTE:  This document has been produced using voice recognition software. Unrecognized errors in transcription may be present.

## 2019-06-07 DIAGNOSIS — C61 PROSTATE CANCER (HCC): Primary | ICD-10-CM

## 2019-06-20 DIAGNOSIS — E27.8 ADRENAL MASS (HCC): ICD-10-CM

## 2019-06-20 DIAGNOSIS — C61 PROSTATE CA (HCC): Primary | ICD-10-CM

## 2019-06-20 DIAGNOSIS — C61 PROSTATE CANCER (HCC): Primary | ICD-10-CM

## 2019-06-28 ENCOUNTER — DOCUMENTATION ONLY (OUTPATIENT)
Dept: UROLOGY | Age: 64
End: 2019-06-28

## 2019-06-28 NOTE — PROGRESS NOTES
Patient had a MRI scheduled for the prostate unfortunately we had a problem with central scheduling. Per orders Dr. Moses Ayoub said to cancel the MRI. Dr. Moses Ayoub is going to talk with Dr. Jose Rizo regarding this Patient . Patient will be call to let know what to do next. Appointment was canceled for Monday and was informed that he will be called after Dr. Moses Ayoub talks with Dr. Jose Rizo.

## 2019-07-15 ENCOUNTER — TELEPHONE (OUTPATIENT)
Dept: UROLOGY | Age: 64
End: 2019-07-15

## 2019-07-15 DIAGNOSIS — C61 PROSTATE CA (HCC): Primary | ICD-10-CM

## 2019-07-15 NOTE — PROGRESS NOTES
81-year-old male PSA 32.0 12 years status post da Sumit radical prostatectomy-urologic oncology requesting PET scan looking for signs of local recurrence which may warrant radiation therapy    PET CT scan requested today    Reinaldo Geronimo MD

## 2019-07-15 NOTE — TELEPHONE ENCOUNTER
Please call the patient and let him know what the status is about having an MRI of the prostate or not. What is his plan of care?

## 2019-08-05 ENCOUNTER — DOCUMENTATION ONLY (OUTPATIENT)
Dept: UROLOGY | Age: 64
End: 2019-08-05

## 2019-08-05 ENCOUNTER — TELEPHONE (OUTPATIENT)
Dept: UROLOGY | Age: 64
End: 2019-08-05

## 2019-08-05 DIAGNOSIS — C61 PROSTATE CA (HCC): Primary | ICD-10-CM

## 2019-08-05 NOTE — TELEPHONE ENCOUNTER
Patient calling stating he is still confused about what is going on with the PET scan. Patient states he hasn't heard from anyone and would like to know what the status is. Please call patient to advise.

## 2019-08-06 NOTE — PROGRESS NOTES
Telephone contact today [phone]    Discussed difficulty arranging PET scan requested by Dr. Ze Frazier scan reordered today      Ruchi Farmer MD

## 2019-08-29 ENCOUNTER — HOSPITAL ENCOUNTER (OUTPATIENT)
Dept: PET IMAGING | Age: 64
Discharge: HOME | End: 2019-08-29
Attending: UROLOGY

## 2019-08-29 DIAGNOSIS — R97.20 PSA ELEVATION: ICD-10-CM

## 2019-08-29 DIAGNOSIS — C61 PROSTATE CA (HCC): ICD-10-CM

## 2019-09-04 ENCOUNTER — DOCUMENTATION ONLY (OUTPATIENT)
Dept: UROLOGY | Age: 64
End: 2019-09-04

## 2019-09-04 NOTE — PROGRESS NOTES
Patient called to let us know he was unable to do the Pet Scan ,he is claustrophobic and when they put the straps over his shoulder it was hurting him. I let Dr. Fahad Robles know what happened. I called to talk to the Patient had to leave message .

## 2019-09-06 ENCOUNTER — DOCUMENTATION ONLY (OUTPATIENT)
Dept: UROLOGY | Age: 64
End: 2019-09-06

## 2019-09-06 NOTE — PROGRESS NOTES
Mr. Charity Glover called to let us know he was unable yo do the Pet scan. He said he is claustrophobic and when they put the straps on him it was hurting him. I  Let Dr. Meredith Larios know . I called and spoke with Mr. Charity Glover and told him there was no open Scan he could go through. He said he thinks he has to take care of his arthritis before he can have any kind . I told him to call us when his he is able to do the scan.

## 2019-10-02 ENCOUNTER — TELEPHONE (OUTPATIENT)
Dept: UROLOGY | Age: 64
End: 2019-10-02

## 2019-10-02 NOTE — TELEPHONE ENCOUNTER
Patient called and he stated that he is ready to do the PET Scan. He will take medications to help him do his pet scan. Please confirm that the orders for his pet scan are still current/correct. Thank you.

## 2019-10-17 ENCOUNTER — HOSPITAL ENCOUNTER (OUTPATIENT)
Dept: PET IMAGING | Age: 64
Discharge: HOME | End: 2019-10-17
Attending: UROLOGY
Payer: MEDICARE

## 2019-10-17 DIAGNOSIS — C61 PROSTATE CA (HCC): ICD-10-CM

## 2019-10-17 PROCEDURE — 78815 PET IMAGE W/CT SKULL-THIGH: CPT

## 2019-10-28 ENCOUNTER — OFFICE VISIT (OUTPATIENT)
Dept: UROLOGY | Age: 64
End: 2019-10-28

## 2019-10-28 VITALS
HEIGHT: 70 IN | WEIGHT: 271 LBS | BODY MASS INDEX: 38.8 KG/M2 | OXYGEN SATURATION: 96 % | SYSTOLIC BLOOD PRESSURE: 122 MMHG | DIASTOLIC BLOOD PRESSURE: 79 MMHG | HEART RATE: 59 BPM

## 2019-10-28 DIAGNOSIS — C61 PROSTATE CANCER (HCC): Primary | ICD-10-CM

## 2019-10-28 NOTE — PATIENT INSTRUCTIONS
Prostate Cancer: Care Instructions  Your Care Instructions    The prostate gland is a small, walnut-shaped organ that lies just below a man's bladder. It surrounds the urethra, the tube that carries urine from the bladder out of the body through the penis. Prostate cancer is the abnormal growth of cells in the prostate gland. Prostate cancer cells can spread within the prostate, to nearby lymph nodes and other tissues, and to other parts of the body. When the cancer hasn't spread outside the prostate, it is called localized prostate cancer. With localized prostate cancer, your options depend on how likely it is that your cancer will grow. Tests will show if your cancer is likely to grow. · Low-risk cancer isn't likely to grow right away. If your cancer is low-risk, you can choose active surveillance. This means your cancer will be watched closely by your doctor with regular checkups and tests to see if the cancer grows. This choice allows you to delay having surgery or radiation, often for many years. If the cancer grows very slowly, you may never need treatment. · Medium-risk cancer is more likely to grow. Some men with this type of cancer may be able to choose active surveillance. Others may need to choose surgery or radiation. · High-risk cancer is most likely to grow. If you have high-risk cancer, you will likely need to choose surgery or radiation. If your cancer has already spread outside the prostate or to other parts of the body, then you may have other treatments, like chemotherapy or hormone therapy. If you are over age [de-identified] or have other serious health problems, like heart disease, you may choose not to have treatments to cure your cancer. Instead, you can just have treatments to manage your symptoms. This is called watchful waiting. Finding out that you have cancer is scary. You may feel many emotions and may need some help coping. Seek out family, friends, and counselors for support.  You also can do things at home to make yourself feel better while you go through treatment. Call the [facility] ([phone]) or visit its website at [address] [employer] for more information. Follow-up care is a key part of your treatment and safety. Be sure to make and go to all appointments, and call your doctor if you are having problems. It's also a good idea to know your test results and keep a list of the medicines you take. How can you care for yourself at home? · Your doctor will talk to you about your treatment options. You may need to learn more about each of them before you can decide which treatment is best for you. · Take your medicines exactly as prescribed. Call your doctor if you think you are having a problem with your medicine. You will get more details on the specific medicines your doctor prescribes. · Eat healthy food. If you do not feel like eating, try to eat food that has protein and extra calories to keep up your strength and prevent weight loss. Drink liquid meal replacements for extra calories and protein. Try to eat your main meal early. · Take steps to control your stress and workload. Learn relaxation techniques. ? Share your feelings. Stress and tension affect our emotions. By expressing your feelings to others, you may be able to understand and cope with them. ? Consider joining a support group. Talking about a problem with your spouse, a good friend, or other people with similar problems is a good way to reduce tension and stress. ? Express yourself through art. Try writing, crafts, dance, or art to relieve stress. Some dance, writing, or art groups may be available just for people who have cancer. ? Be kind to your body and mind. Getting enough sleep, eating a healthy diet, and taking time to do things you enjoy can contribute to an overall feeling of balance in your life and can help reduce stress. ? Get help if you need it.  Discuss your concerns with your doctor or counselor. · Get some physical activity every day, but do not get too tired. Keep doing the hobbies you enjoy as your energy allows. · If you are vomiting or have diarrhea:  ? Drink plenty of fluids (enough so that your urine is light yellow or clear like water) to prevent dehydration. Choose water and other caffeine-free clear liquids. If you have kidney, heart, or liver disease and have to limit fluids, talk with your doctor before you increase the amount of fluids you drink. ? When you are able to eat, try clear soups, mild foods, and liquids until all symptoms are gone for 12 to 48 hours. Jell-O, dry toast, crackers, and cooked cereal are also good choices. · If you have not already done so, prepare a list of advance directives. Advance directives are instructions to your doctor and family members about what kind of care you want if you become unable to speak or express yourself. When should you call for help? Call 911 anytime you think you may need emergency care. For example, call if:    · You passed out (lost consciousness).    Call your doctor now or seek immediate medical care if:    · You have new or worse pain.     · You have new symptoms, such as a cough, belly pain, vomiting, diarrhea, or a rash.     · You have symptoms of a urinary tract infection. For example:  ? You have blood or pus in your urine. ? You have pain in your back just below your rib cage. This is called flank pain. ? You have a fever, chills, or body aches. ? It hurts to urinate. ? You have groin or belly pain.    Watch closely for changes in your health, and be sure to contact your doctor if:    · You have swollen glands in your armpits, groin, or neck.     · You have trouble controlling your urine.     · You do not get better as expected. Where can you learn more? Go to http://yogi-ethan.info/. Enter V141 in the search box to learn more about \"Prostate Cancer: Care Instructions. \"  Current as of: December 19, 2018  Content Version: 12.2  © 5857-6765 [employer]. Care instructions adapted under license by [employer] (which disclaims liability or warranty for this information). If you have questions about a medical condition or this instruction, always ask your healthcare professional. [address] any warranty or liability for your use of this information.

## 2019-10-28 NOTE — PROGRESS NOTES
Mr. Espinoza Fraction has a reminder for a \"due or due soon\" health maintenance. I have asked that he contact his primary care provider for follow-up on this health maintenance.

## 2019-10-29 ENCOUNTER — DOCUMENTATION ONLY (OUTPATIENT)
Dept: UROLOGY | Age: 64
End: 2019-10-29

## 2019-10-29 LAB — PSA SERPL-MCNC: 0.4 NG/ML (ref 0–4)

## 2019-10-29 NOTE — PROGRESS NOTES
Luis Angel Adams 62 y.o. male  Prostate Carcinoma Da Sumit Prostatectomy 2008 (Dr. Luis Cortez seen today for evaluation of recent rise in  PSA level status post radical prostatectomy 2008/PSA 34.7-here today for follow-up of [facility] PET scan    PET CT Axumim scan 17 October 2019:   IMPRESSION:  1. By [id number] PAZ [place] PET interpretation criteria, a subcentimeter left periaortic lymph  node is considered likely malignant. Please note that false positive may be  present due to regional artifact. Follow-up recommended. 2. No evidence of recurrent disease in the prostatectomy bed or metastatic  disease elsewhere. Patient has no irritative or obstructive voiding symptoms no complaints regarding urination at this time     PSA 0.1 in January 2010  PSA 0.1 in August 2011  PSA 0.1 2012  PSA 0.3 in 2019   PSA 0.4 on 2 May 2019     PVR 38 cc in January 2019        Review of Systems:   CNS: Seizure syncope headaches dizziness or visual changes  Respiratory: No wheezing shortness of breath chest pain or coughing  Cardiovascular: No angina no palpitations  Intestinal: No dyspepsia diarrhea or constipation  Urinary: No urgency frequency dysuria or gross hematuria  Skeletal: Lumbar disc disease aches and pains large joint arthritis chronic low back pain  Endocrine: No diabetes or thyroid disease  Other:     Allergies: Allergies   Allergen Reactions    Morphine Other (comments)     Chest pain      Medications:    Current Outpatient Medications   Medication Sig Dispense Refill    glucosamine-chondroitin (ARTHX) 500-400 mg cap Take 1 Cap by mouth daily.  DULoxetine (CYMBALTA) 30 mg capsule Take 30 mg by mouth daily.  menthol/camphor (BIOFREEZE EX) by Apply Externally route.  triamcinolone acetonide (KENALOG) 0.1 % topical cream       TORRES SEAL ROOT PO Take  by mouth.  magnesium 250 mg tab Take  by mouth.  cholecalciferol, vitamin D3, (VITAMIN D3) 2,000 unit tab Take  by mouth.       escitalopram oxalate (LEXAPRO) 10 mg tablet Take 1 Tab by mouth daily. 30 Tab 3    oxyCODONE IR (ROXICODONE) 10 mg tab immediate release tablet Take 1 Tab by mouth every four (4) hours as needed. Max Daily Amount: 60 mg. 180 Tab 0    ibuprofen (MOTRIN) 800 mg tablet Take  by mouth.  MULTIVIT &MINERALS/FERROUS FUM (MULTI VITAMIN PO) Take  by mouth.  rosuvastatin (CRESTOR) 10 mg tablet Take 10 mg by mouth nightly.  CALCIUM CARBONATE/VITAMIN D3 (CALCIUM+D PO) Take  by mouth.  CYANOCOBALAMIN, VITAMIN B-12, (VITAMIN B-12 PO) Take  by mouth.  DOCOSAHEXANOIC ACID/EPA (FISH OIL PO) Take  by mouth.          Past Medical History:   Diagnosis Date    Anxiety     Arthritis     Back pain 2010    Cancer [facility])     Prostate    Chronic obstructive pulmonary disease (HCC)     PT DENIES    Hernia of unspecified site of abdominal cavity without mention of obstruction or gangrene     Radiculopathy     Tobacco dependence       Past Surgical History:   Procedure Laterality Date    COLONOSCOPY N/A 2017    COL[facility]OS[facility] / [facility]    HX BACK SURGERY  11/10    HX HERNIA REPAIR  2007    [facility]    HX LUMBAR LAMINECTOMY  2015    L4, L5    HX OTHER SURGICAL  10/2008    Prostate    HX PROSTATECTOMY       Social History     Socioeconomic History    Marital status:      Spouse name: Not on file    Number of children: Not on file    Years of education: Not on file    Highest education level: Not on file   Occupational History    Not on file   Social Needs    Financial resource strain: Not on file    Food insecurity:     Worry: Not on file     Inability: Not on file    Transportation needs:     Medical: Not on file     Non-medical: Not on file   Tobacco Use    Smoking status: Former Smoker     Packs/day: 1.00     Years: 35.00     Pack years: 35.00     Last attempt to quit: 10/1/2008     Years since quittin.0    Smokeless tobacco: Never Used Substance and Sexual Activity    Alcohol use: Not Currently     Alcohol/week: 1.0 standard drinks     Types: 1 Standard drinks or equivalent per week     Comment: twice per year    Drug use: No    Sexual activity: Yes     Partners: Female     Birth control/protection: None   Lifestyle    Physical activity:     Days per week: Not on file     Minutes per session: Not on file    Stress: Not on file   Relationships    Social connections:     Talks on phone: Not on file     Gets together: Not on file     Attends Anabaptist service: Not on file     Active member of club or organization: Not on file     Attends meetings of clubs or organizations: Not on file     Relationship status: Not on file    Intimate partner violence:     Fear of current or ex partner: Not on file     Emotionally abused: Not on file     Physically abused: Not on file     Forced sexual activity: Not on file   Other Topics Concern    Not on file   Social History Narrative    Not on file      Family History   Problem Relation Age of Onset    Heart Disease Father     Stroke Father     Cancer Mother     Cancer Brother     Cancer Other     Cancer Other         Physical Examination: Well-nourished mature male in no apparent distress    Urinalysis: Negative dipstick/nitrite negative heme-negative    Impression: Biochemical recurrence prostate carcinoma status post da Sumit prostatectomy 2008    Plan: Testosterone ablation therapy is indicated and recommended    PSA today    Initiate testosterone ablation therapy after consultation with Urologic Oncology-Dr. Westbrook      More than 1/2 of this 25 minute visit was spent in counselling and coordination of care, as described above. Anna Galvez MD  -electronically signed-    PLEASE NOTE:  This document has been produced using voice recognition software. Unrecognized errors in transcription may be present.

## 2019-10-30 ENCOUNTER — DOCUMENTATION ONLY (OUTPATIENT)
Dept: UROLOGY | Age: 64
End: 2019-10-30

## 2019-10-30 NOTE — PROGRESS NOTES
Telephone saltation with Dr. rivera today regarding Mr. Rand Dayne    PET CT scan positive for single periaortic node      Recommends consideration of Spot XRT to region of node-refer to radiation oncology-Dr. Shafer  Hold androgen deprivation therapy in light of low PSA level    Patient notified by telephone today will arrange follow-up with Dr. Riana Teran MD

## 2019-10-30 NOTE — PROGRESS NOTES
Telephone consultation with radiation oncology-Dr. Jessica Kelsey today    Reference consultation with urologic oncology Dr. Thad Nesbitt yesterday    Will refer patient to Dr. Jessica Kelsey for evaluation and consideration as a candidate for Spot XRT to PET scan positive region periaortic nodes      Patient notified by telephone      Jyoti Lara MD

## 2019-11-04 ENCOUNTER — HOSPITAL ENCOUNTER (OUTPATIENT)
Dept: RADIATION THERAPY | Age: 64
Discharge: HOME | End: 2019-11-04
Payer: MEDICARE

## 2019-11-04 PROCEDURE — 99211 OFF/OP EST MAY X REQ PHY/QHP: CPT

## 2019-11-12 ENCOUNTER — HOSPITAL ENCOUNTER (OUTPATIENT)
Dept: RADIATION THERAPY | Age: 64
Discharge: HOME | End: 2019-11-12
Payer: MEDICARE

## 2019-11-12 PROCEDURE — 77334 RADIATION TREATMENT AID(S): CPT

## 2019-11-13 ENCOUNTER — HOSPITAL ENCOUNTER (OUTPATIENT)
Dept: RADIATION THERAPY | Age: 64
Discharge: HOME | End: 2019-11-13
Payer: MEDICARE

## 2019-11-13 PROCEDURE — 77338 DESIGN MLC DEVICE FOR IMRT: CPT

## 2019-11-13 PROCEDURE — 77300 RADIATION THERAPY DOSE PLAN: CPT

## 2019-11-13 PROCEDURE — 77301 RADIOTHERAPY DOSE PLAN IMRT: CPT

## 2019-11-15 ENCOUNTER — HOSPITAL ENCOUNTER (OUTPATIENT)
Dept: RADIATION THERAPY | Age: 64
Discharge: HOME | End: 2019-11-15
Payer: MEDICARE

## 2019-11-18 ENCOUNTER — APPOINTMENT (OUTPATIENT)
Dept: RADIATION THERAPY | Age: 64
End: 2019-11-18
Payer: MEDICARE

## 2019-11-19 ENCOUNTER — APPOINTMENT (OUTPATIENT)
Dept: RADIATION THERAPY | Age: 64
End: 2019-11-19
Payer: MEDICARE

## 2019-11-20 ENCOUNTER — APPOINTMENT (OUTPATIENT)
Dept: RADIATION THERAPY | Age: 64
End: 2019-11-20
Payer: MEDICARE

## 2019-11-21 ENCOUNTER — APPOINTMENT (OUTPATIENT)
Dept: RADIATION THERAPY | Age: 64
End: 2019-11-21
Payer: MEDICARE

## 2019-11-22 ENCOUNTER — APPOINTMENT (OUTPATIENT)
Dept: RADIATION THERAPY | Age: 64
End: 2019-11-22
Payer: MEDICARE

## 2019-12-02 ENCOUNTER — HOSPITAL ENCOUNTER (OUTPATIENT)
Dept: RADIATION THERAPY | Age: 64
Discharge: HOME | End: 2019-12-02
Payer: MEDICARE

## 2019-12-03 ENCOUNTER — HOSPITAL ENCOUNTER (OUTPATIENT)
Dept: RADIATION THERAPY | Age: 64
Discharge: HOME | End: 2019-12-03
Payer: MEDICARE

## 2019-12-03 PROCEDURE — 77373 STRTCTC BDY RAD THER TX DLVR: CPT

## 2019-12-05 ENCOUNTER — HOSPITAL ENCOUNTER (OUTPATIENT)
Dept: RADIATION THERAPY | Age: 64
Discharge: HOME | End: 2019-12-05
Payer: MEDICARE

## 2019-12-05 PROCEDURE — 77373 STRTCTC BDY RAD THER TX DLVR: CPT

## 2019-12-09 ENCOUNTER — HOSPITAL ENCOUNTER (OUTPATIENT)
Dept: RADIATION THERAPY | Age: 64
Discharge: HOME | End: 2019-12-09
Payer: MEDICARE

## 2019-12-09 PROCEDURE — 77373 STRTCTC BDY RAD THER TX DLVR: CPT

## 2019-12-16 DIAGNOSIS — C61 PROSTATE CARCINOMA (HCC): Primary | ICD-10-CM

## 2019-12-16 NOTE — PROGRESS NOTES
Patient has completed spot XRT to lumbar lymph node positive on PET CAT scan    Repeat PSA test ordered for late February    Follow-up with Dr. Eda Gallardo at [facility] [place] [facility] in March 2020      Telephone contact with patient today [phone] answer left message to contact me regarding plans for follow-up with Urology Thuy Schmitt MD

## 2019-12-18 ENCOUNTER — DOCUMENTATION ONLY (OUTPATIENT)
Dept: UROLOGY | Age: 64
End: 2019-12-18

## 2019-12-18 NOTE — PROGRESS NOTES
Telephone contact with patient [phone]    Patient has completed spot XRT for lymph node recurrence of prostate carcinoma completing therapy in November 2019 with Dr. Skinny Morales patient advised to follow-up with [facility] with Dr. Belgica Hunter who performed initial [address] prostatectomy in 2008    Health insurance plan limitations may exclude follow-up with [facility]    Patient informed-[facility] office offer urology practice has been closed by [facility]    Patient will follow-up with Dr. Blaze Ceballos in January 2020-we will make arrangements with insurance company for follow-up with urology service accepting his plan      Boris Abernathy MD

## 2020-01-21 ENCOUNTER — HOSPITAL ENCOUNTER (OUTPATIENT)
Dept: RADIATION THERAPY | Age: 65
Discharge: HOME | End: 2020-01-21
Payer: MEDICARE

## 2020-01-21 PROCEDURE — 99211 OFF/OP EST MAY X REQ PHY/QHP: CPT

## 2020-06-19 ENCOUNTER — APPOINTMENT (OUTPATIENT)
Dept: RADIATION THERAPY | Age: 65
End: 2020-06-19

## 2020-07-27 ENCOUNTER — HOSPITAL ENCOUNTER (OUTPATIENT)
Dept: LAB | Age: 65
Discharge: HOME | End: 2020-07-27
Payer: MEDICARE

## 2020-07-27 DIAGNOSIS — C61 MALIGNANT NEOPLASM OF PROSTATE (HCC): ICD-10-CM

## 2020-07-27 LAB — PSA SERPL-MCNC: 0.6 NG/ML (ref 0–4)

## 2020-07-27 PROCEDURE — 36415 COLL VENOUS BLD VENIPUNCTURE: CPT

## 2020-07-27 PROCEDURE — 84153 ASSAY OF PSA TOTAL: CPT

## 2020-07-27 PROCEDURE — 84403 ASSAY OF TOTAL TESTOSTERONE: CPT

## 2020-07-28 LAB — TESTOST SERPL-MCNC: 200 NG/DL (ref 264–916)

## 2020-08-03 ENCOUNTER — OFFICE VISIT (OUTPATIENT)
Dept: ORTHOPEDIC SURGERY | Facility: CLINIC | Age: 65
End: 2020-08-03

## 2020-08-03 VITALS
WEIGHT: 272.4 LBS | BODY MASS INDEX: 39 KG/M2 | HEART RATE: 72 BPM | DIASTOLIC BLOOD PRESSURE: 82 MMHG | SYSTOLIC BLOOD PRESSURE: 129 MMHG | TEMPERATURE: 97.3 F | HEIGHT: 70 IN

## 2020-08-03 DIAGNOSIS — G89.29 CHRONIC RIGHT SHOULDER PAIN: ICD-10-CM

## 2020-08-03 DIAGNOSIS — M25.562 CHRONIC PAIN OF LEFT KNEE: ICD-10-CM

## 2020-08-03 DIAGNOSIS — M25.561 CHRONIC PAIN OF RIGHT KNEE: ICD-10-CM

## 2020-08-03 DIAGNOSIS — M75.01 ADHESIVE CAPSULITIS OF RIGHT SHOULDER: ICD-10-CM

## 2020-08-03 DIAGNOSIS — G89.29 CHRONIC PAIN OF LEFT KNEE: ICD-10-CM

## 2020-08-03 DIAGNOSIS — M25.511 CHRONIC RIGHT SHOULDER PAIN: ICD-10-CM

## 2020-08-03 DIAGNOSIS — M54.50 LUMBAR PAIN: ICD-10-CM

## 2020-08-03 DIAGNOSIS — M17.11 PRIMARY OSTEOARTHRITIS OF RIGHT KNEE: ICD-10-CM

## 2020-08-03 DIAGNOSIS — M19.011 PRIMARY OSTEOARTHRITIS, RIGHT SHOULDER: Primary | ICD-10-CM

## 2020-08-03 DIAGNOSIS — M17.12 PRIMARY OSTEOARTHRITIS OF LEFT KNEE: ICD-10-CM

## 2020-08-03 DIAGNOSIS — G89.29 CHRONIC PAIN OF RIGHT KNEE: ICD-10-CM

## 2020-08-03 RX ORDER — AA/PROT/LYSINE/METHIO/VIT C/B6 50-12.5 MG
TABLET ORAL
COMMUNITY
End: 2022-06-16

## 2020-08-03 RX ORDER — BETAMETHASONE SODIUM PHOSPHATE AND BETAMETHASONE ACETATE 3; 3 MG/ML; MG/ML
6 INJECTION, SUSPENSION INTRA-ARTICULAR; INTRALESIONAL; INTRAMUSCULAR; SOFT TISSUE ONCE
Qty: 0.5 ML | Refills: 0
Start: 2020-08-03 | End: 2020-08-03

## 2020-08-03 RX ORDER — NAPROXEN 250 MG/1
TABLET ORAL 2 TIMES DAILY WITH MEALS
COMMUNITY
End: 2020-11-03
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
## 2020-08-07 ENCOUNTER — HOSPITAL ENCOUNTER (OUTPATIENT)
Dept: RADIATION THERAPY | Age: 65
Discharge: HOME | End: 2020-08-07

## 2020-08-26 ENCOUNTER — OFFICE VISIT (OUTPATIENT)
Dept: ORTHOPEDIC SURGERY | Age: 65
End: 2020-08-26

## 2020-08-26 VITALS
RESPIRATION RATE: 16 BRPM | BODY MASS INDEX: 39.03 KG/M2 | HEART RATE: 68 BPM | HEIGHT: 70 IN | SYSTOLIC BLOOD PRESSURE: 124 MMHG | OXYGEN SATURATION: 94 % | TEMPERATURE: 98.1 F | DIASTOLIC BLOOD PRESSURE: 84 MMHG | WEIGHT: 272.6 LBS

## 2020-08-26 DIAGNOSIS — Z98.1 HX OF SPINAL FUSION: ICD-10-CM

## 2020-08-26 DIAGNOSIS — M47.816 LUMBAR SPONDYLOSIS: ICD-10-CM

## 2020-08-26 DIAGNOSIS — M51.36 DDD (DEGENERATIVE DISC DISEASE), LUMBAR: Primary | ICD-10-CM

## 2020-08-26 DIAGNOSIS — F11.90 CHRONIC, CONTINUOUS USE OF OPIOIDS: ICD-10-CM

## 2020-08-26 RX ORDER — BACLOFEN 10 MG/1
5-10 TABLET ORAL
Qty: 90 TAB | Refills: 1 | Status: SHIPPED | OUTPATIENT
Start: 2020-08-26 | End: 2020-09-10

## 2020-08-26 RX ORDER — CHOLECALCIFEROL (VITAMIN D3) 50 MCG
CAPSULE ORAL
COMMUNITY
End: 2022-06-16

## 2020-08-26 RX ORDER — CYCLOBENZAPRINE HCL 10 MG
TABLET ORAL
COMMUNITY
Start: 2020-08-24 | End: 2020-11-03
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
## 2020-08-26 NOTE — PROGRESS NOTES
Verbal order entered per Dr. Delfina Lilly as documented on blue sheet: 1. Referral for physical therapy for lumbar spondylosis, DDD. 2. Baclofen 10 mg - take 1/2 to 1 tab PO TID as needed for pain. #90 tabs with 1 refill.

## 2020-08-26 NOTE — PATIENT INSTRUCTIONS
Learning About Degenerative Disc Disease  What is degenerative disc disease? Degenerative disc disease isn't really a disease. It's a term used to describe the normal changes in your spinal discs as you age. Spinal discs are small, spongy discs that separate the bones (vertebrae) that make up the spine. The discs act as shock absorbers for the spine. They let your spine flex, bend, and twist.  Degenerative disc disease can take place in one or more places along the spine. It most often occurs in the discs in the lower back and the neck. The changes in the discs can cause back and neck pain. They can also lead to osteoarthritis, a herniated disc, or spinal stenosis. What causes it? As we age, our spinal discs break down, or degenerate. This breakdown causes the symptoms of degenerative disc disease in some people. When the discs break down, they can lose fluid and dry out, and their outer layers can have tiny cracks or tears. This leads to less padding and less space between the bones in the spine. The body reacts to this by making bony growths on the spine called bone spurs. These spurs can press on the spinal nerve roots or spinal cord. This can cause pain and can affect how well the nerves work. These changes in the discs are more likely to occur if you smoke, do heavy physical work (such as repeated heavy lifting), or are very overweight. A sudden injury may also cause changes to occur. What are the symptoms? Many people with degenerative disc disease have no pain. But others have severe pain or other symptoms that limit their activities. Some of the most common symptoms are:  · Pain in the back or neck. Where the pain occurs depends on which discs are affected. · Pain that gets worse when you move, such as when you bend over, reach up, or twist.  · Pain that may occur in the rear end (buttocks), arm, or leg if a nerve is pinched. · Numbness or tingling in your arm or leg.   The pain may start after a major injury (such as from a car accident), a minor injury (such as a fall from a low height), or a normal motion (such as bending over to pick something up). It may also start gradually for no known reason and get worse over time. How is it diagnosed? A doctor can often diagnose degenerative disc disease while doing a physical exam. If your exam shows no signs of a serious condition, imaging tests (such as an X-ray) aren't likely to help your doctor find the cause of your symptoms. Sometimes degenerative disc disease is found when an X-ray is taken for another reason, such as an injury or other health problem. But even if the doctor finds degenerative disc disease, that doesn't always mean that you will have symptoms. How is degenerative disc disease treated? Self-care may be all you need to relieve pain caused by disc changes. This may include using ice or heat and taking over-the-counter medicines. Your doctor can prescribe stronger medicines if needed. If you develop health problems such as osteoarthritis, a herniated disc, or spinal stenosis, you may need other treatments. These include physical therapy and exercises for strengthening and stretching the back. In some cases, surgery may be recommended. It usually involves removing the damaged disc. In some cases, the bone is then permanently joined (fused) to protect the spinal cord. In rare cases, an artificial disc may be used to replace the disc that is removed. How can you care for yourself? Here are some things you can do to help manage pain from degenerative disc disease. · Use ice or heat (whichever feels better) on the affected area. ? Put ice or a cold pack on the area for 10 to 20 minutes at a time. Put a thin cloth between the ice and your skin. ? Put a warm water bottle, a heating pad set on low, or a warm cloth on your back. Put a thin cloth between the heating pad and your skin.  Do not go to sleep with a heating pad on your skin.  · Ask your doctor if you can take an over-the-counter pain medicine. These include acetaminophen (such as Tylenol) and nonsteroidal anti-inflammatory drugs, such as ibuprofen or naproxen. Your doctor can prescribe stronger medicines if needed. Be safe with medicines. Read and follow all instructions on the label. · Get some exercise every day. Exercise is one of the best ways to help your back feel better and stay better. It's best to start each exercise slowly. You may notice a little soreness, and that's okay. But if an exercise makes your pain worse, stop doing it. Here are things you can try:  ? Walking. It's the simplest and maybe the best activity for your back. It gets your blood moving and helps your muscles stay strong. ? Exercises that gently stretch and strengthen your stomach, back, and leg muscles. The stronger those muscles are, the better they're able to protect your back. Follow-up care is a key part of your treatment and safety. Be sure to make and go to all appointments, and call your doctor if you are having problems. It's also a good idea to know your test results and keep a list of the medicines you take. Where can you learn more? Go to http://yogi-ethan.info/  Enter M991 in the search box to learn more about \"Learning About Degenerative Disc Disease. \"  Current as of: March 2, 2020               Content Version: 12.5  © 1959-8777 Healthwise, Incorporated. Care instructions adapted under license by [employer] (which disclaims liability or warranty for this information). If you have questions about a medical condition or this instruction, always ask your healthcare professional. [place] [address] any warranty or liability for your use of this information.

## 2020-08-27 ENCOUNTER — HOSPITAL ENCOUNTER (OUTPATIENT)
Dept: PET IMAGING | Age: 65
Discharge: HOME | End: 2020-08-27
Attending: RADIOLOGY
Payer: MEDICARE

## 2020-08-27 DIAGNOSIS — C61 MALIGNANT NEOPLASM OF PROSTATE (HCC): ICD-10-CM

## 2020-08-27 PROCEDURE — A9588 FLUCICLOVINE F-18: HCPCS

## 2020-09-08 ENCOUNTER — TELEPHONE (OUTPATIENT)
Dept: ORTHOPEDIC SURGERY | Age: 65
End: 2020-09-08

## 2020-09-08 DIAGNOSIS — M47.816 LUMBAR SPONDYLOSIS: Primary | ICD-10-CM

## 2020-09-08 NOTE — TELEPHONE ENCOUNTER
Patient called because the baclofen has stopped working, he says it helped with pain at first but now it isn't helping. He would like something stronger.  Patient can be reached at [phone]

## 2020-09-08 NOTE — TELEPHONE ENCOUNTER
He can try increasing the baclofen to 1 tab qid. We cannot give any opioid medication.   If he needs opioid pain medication he will have to go back to pain management

## 2020-09-09 NOTE — TELEPHONE ENCOUNTER
Spoke with pt, informed of the Provider message below. Pt stated that he is not asking for an opioid. Pt prefers to be sent in [id number] [employer]. Please advise.

## 2020-09-10 NOTE — TELEPHONE ENCOUNTER
Trav Palma is a controlled substance for short term use. He has chronic pain. D/c baclofen since it is not working. Will RX non-opioid pain medication, Lyrica. VO:  Start Lyrica 75mg one po qhs x5 days, then increase to one po bid x5 days, then one po tid thereafter. This should help his back and his legs.

## 2020-09-10 NOTE — TELEPHONE ENCOUNTER
Spoke with pt, informed of the Provider message below. Pt stated understanding, and is willing to try the Lyrica. Pended medication to be sent, and pharmacy verified.

## 2020-09-10 NOTE — TELEPHONE ENCOUNTER
Spoke with pt, informed of NP Nancy message below. Pt stated he saw Dr. Wang Ames on 8/26 he does not have an addictive personality, and was not requesting opioids. While he has increased the Baclofen to QID as instructed, it is not doing anything for him. Pt requested message be sent to Dr. Wang Ames for consideration. Please advise.

## 2020-09-11 RX ORDER — PREGABALIN 75 MG/1
CAPSULE ORAL
Qty: 90 CAP | Refills: 0 | Status: SHIPPED | OUTPATIENT
Start: 2020-09-11 | End: 2020-11-03

## 2020-09-14 ENCOUNTER — HOSPITAL ENCOUNTER (OUTPATIENT)
Dept: PHYSICAL THERAPY | Age: 65
Discharge: HOME | End: 2020-09-14
Payer: MEDICARE

## 2020-09-14 PROCEDURE — 97161 PT EVAL LOW COMPLEX 20 MIN: CPT

## 2020-09-14 PROCEDURE — 97530 THERAPEUTIC ACTIVITIES: CPT

## 2020-09-14 NOTE — PROGRESS NOTES
PT DAILY TREATMENT NOTE 10-18    Patient Name: Yudith Bosch  Date:2020  : 1955  [x]  Patient  Verified  Payor: AARP MEDICARE COMPLETE / [facility] / Product Type: Managed Care Medicare /    In time:9:05  Out time:9:45  Total Treatment Time (min): 40  Visit #: 1 of 10    Medicare/BCBS Only   Total Timed Codes (min):  10 1:1 Treatment Time:  40       Treatment Area: Low back pain [M54.5]  Other intervertebral disc degeneration, lumbar region [M51.36]  Spondylosis without myelopathy or radiculopathy, lumbar region [M47.816]    SUBJECTIVE  Pain Level (0-10 scale): 10  Any medication changes, allergies to medications, adverse drug reactions, diagnosis change, or new procedure performed?: [x] No    [] Yes (see summary sheet for update)  Subjective functional status/changes:   [] No changes reported  See POC    OBJECTIVE    30 min [x]Eval                  []Re-Eval     10 min Therapeutic Activity:  []  See flow sheet : Patient education on therapy assessment, prognosis, expectations for therapy sessions, patient goals, concern over urinary continence, benefit of aquatic therapy, and HEP. Rationale: to improve the patients ability to adhere to HEP and therapy sessions for increased compliance when working toward therapy goals.          With   [] TE   [x] TA   [] neuro   [] other: Patient Education: [x] Review HEP    [] Progressed/Changed HEP based on:   [] positioning   [] body mechanics   [] transfers   [] heat/ice application    [] other:      Other Objective/Functional Measures: See POC     Pain Level (0-10 scale) post treatment: 10    ASSESSMENT/Changes in Function: See POC    Patient will continue to benefit from skilled PT services to modify and progress therapeutic interventions, address functional mobility deficits, address ROM deficits, address strength deficits, analyze and address soft tissue restrictions, analyze and cue movement patterns, analyze and modify body mechanics/ergonomics, assess and modify postural abnormalities, address imbalance/dizziness and instruct in home and community integration to attain remaining goals.      [x]  See Plan of Care  []  See progress note/recertification  []  See Discharge Summary         Progress towards goals / Updated goals:  See POC    PLAN  [x]  Upgrade activities as tolerated     []  Continue plan of care  [x]  Update interventions per flow sheet       []  Discharge due to:_  []  Other:_      [place] Pickup 9/14/2020  8:27 AM    Future Appointments   Date Time Provider Desi Grady   9/14/2020  9:00 AM [facility]   10/7/2020  1:15 PM Isaak Ha  [address]   10/14/2020 11:10 AM [facility]   10/21/2020  9:30 AM Given, Nishant Wick MD [facility]
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
## 2020-09-15 DIAGNOSIS — Z98.1 HX OF SPINAL FUSION: Primary | ICD-10-CM

## 2020-09-15 DIAGNOSIS — M47.816 LUMBAR SPONDYLOSIS: ICD-10-CM

## 2020-09-15 NOTE — PROGRESS NOTES
Received message from PT about progressive night LBP and worsening urinary incontinence. Will hold PT.  VO MRI, lumbar spine with and WO contrast for increasing LBP radiating into B/L LEs, night pain, worsening urinary incontinence.  Hx sx, last lami 2015

## 2020-09-17 RX ORDER — DIAZEPAM 10 MG/1
TABLET ORAL
Qty: 2 TAB | Refills: 0 | Status: SHIPPED | OUTPATIENT
Start: 2020-09-17 | End: 2020-11-03

## 2020-09-17 NOTE — PROGRESS NOTES
Spoke with pt, informed of Dr. Jayant Ahumada message below. Verbal order for MRI L-spine w+wo due to LBP radiating into B/L LEs, night pain, worsening urinary incontinence. Hx sx, last lami 2015. Pt given number to Central Sched[facility] to contact if he has not heard from them by Monday. Pt already scheduled with Dr. Jayant Ahumada for 10/7, MRI should be done prior to this. Pt requesting Valium for MRI, he stated previously he was given 1 tab to take an hour prior, then another tab to take half hour prior. Pharmacy verified. Pended Valium 10mg take 1 tab po 1 hour prior to MRI, may take another tab po 30 minutes prior to MRI. Must  to and from location.  Per your approval.

## 2020-09-28 ENCOUNTER — TELEPHONE (OUTPATIENT)
Dept: ORTHOPEDIC SURGERY | Age: 65
End: 2020-09-28

## 2020-09-28 NOTE — TELEPHONE ENCOUNTER
Patient states he has been trying to reach clinical and is awaiting a return call. I was not able to get a reason, but he is asking for a return call please.   [phone]

## 2020-09-28 NOTE — TELEPHONE ENCOUNTER
Pt stated he was taking the Lyrica and it seemed to be working, but he noticed that it was making his throat so dry he could not swallow. Pt stopped the Lyrica on Saturday and requesting we send something to the Pharmacy for his pain. Pt reminded that he does have an appt with her on 10/7, but pt stated he cannot wait until then to get something. Pt asked about Soma again. Pt was denied Soma on a previous telephone encounter, and I reminded him of that as well. Pt stated he is not asking for Soma long term just temporarily. Please advise.

## 2020-09-28 NOTE — TELEPHONE ENCOUNTER
Sayda Barbosa is rarely ever Rx anymore (even by PM) and has essentially been stopped by most providers, even short term. We do not Rx that here, even in small doses as SOMA=COMA.      His options are Topamax if no hx of glaucoma or kidney stones, Gabapentin or Gabitril

## 2020-09-28 NOTE — TELEPHONE ENCOUNTER
Spoke with pt, informed of the Provider message below. Pt stated understanding, and that he will see Dr. Nadir Appiah on 10/7. No further actions needed at this time.

## 2020-10-03 ENCOUNTER — HOSPITAL ENCOUNTER (OUTPATIENT)
Age: 65
Discharge: HOME | End: 2020-10-03
Attending: PHYSICAL MEDICINE & REHABILITATION
Payer: MEDICARE

## 2020-10-03 DIAGNOSIS — Z98.1 HX OF SPINAL FUSION: ICD-10-CM

## 2020-10-03 DIAGNOSIS — M47.816 LUMBAR SPONDYLOSIS: ICD-10-CM

## 2020-10-03 LAB — CREAT UR-MCNC: 0.8 MG/DL (ref 0.6–1.3)

## 2020-10-03 PROCEDURE — 82565 ASSAY OF CREATININE: CPT

## 2020-10-05 ENCOUNTER — TELEPHONE (OUTPATIENT)
Dept: ORTHOPEDIC SURGERY | Age: 65
End: 2020-10-05

## 2020-10-05 DIAGNOSIS — M47.816 LUMBAR SPONDYLOSIS: ICD-10-CM

## 2020-10-05 DIAGNOSIS — Z98.1 HX OF SPINAL FUSION: Primary | ICD-10-CM

## 2020-10-05 NOTE — TELEPHONE ENCOUNTER
Patient called asking to speak with the nurse regarding a matter he did not wish to discuss with me.  Please advise patient at [phone]

## 2020-10-05 NOTE — TELEPHONE ENCOUNTER
We can offer him a RX of Valium for pre-treatment OR we can set him up for a seated MRI. If his involuntary movements are from trying to lay flat on his back, than the seated MRI option would be better.

## 2020-10-05 NOTE — TELEPHONE ENCOUNTER
Spoke with pt, he stated that he attempted to do the MRI on Saturday however it was incomplete due to involuntary movements, could not keep still due to the pain. Pt is scheduled for an MRI fu on 10/7. Pt stated he can attempt the MRI again but it will have to be with sedation, or with something for pain and a valium. Pt was not sure what our office would like him to do at this point, or if Radiology notifies us when a pt cannot complete a study. Please advise, also should pt keep 10/7 appt. He states he is not any better.

## 2020-10-06 DIAGNOSIS — Z98.1 HX OF SPINAL FUSION: ICD-10-CM

## 2020-10-06 DIAGNOSIS — M47.816 LUMBAR SPONDYLOSIS: ICD-10-CM

## 2020-10-06 NOTE — TELEPHONE ENCOUNTER
Spoke with pt, informed that seated MRI has been placed. Referral Coordinator Sunita Bird has been notified as well. Pt will be contacted for scheduling, no further actions needed at this time.

## 2020-10-09 ENCOUNTER — TELEPHONE (OUTPATIENT)
Dept: ORTHOPEDIC SURGERY | Age: 65
End: 2020-10-09

## 2020-10-09 DIAGNOSIS — M47.816 LUMBAR SPONDYLOSIS: Primary | ICD-10-CM

## 2020-10-09 DIAGNOSIS — Z98.1 HX OF SPINAL FUSION: ICD-10-CM

## 2020-10-09 NOTE — TELEPHONE ENCOUNTER
Please enter orders for BUN/Creatitine labs to be completed. Ivelisse Tyner requires these labs prior to the MRI for patients over 62 yo. Let me know once lab orders have been entered. Thank you.

## 2020-10-16 DIAGNOSIS — M47.816 LUMBAR SPONDYLOSIS: ICD-10-CM

## 2020-10-16 DIAGNOSIS — M48.062 SPINAL STENOSIS OF LUMBAR REGION WITH NEUROGENIC CLAUDICATION: Primary | ICD-10-CM

## 2020-10-16 DIAGNOSIS — M51.36 DDD (DEGENERATIVE DISC DISEASE), LUMBAR: ICD-10-CM

## 2020-10-16 DIAGNOSIS — Z98.1 HX OF SPINAL FUSION: ICD-10-CM

## 2020-10-16 NOTE — PROGRESS NOTES
[facility] - [facility], [address] [place] [address]  [phone] fax    Discharge Summary    Patient name: Doak Lefort Start of Care: 20   Referral source: Joanna Bird MD : 1955   Medical/Treatment Diagnosis: Low back pain [M54.5]  Other intervertebral disc degeneration, lumbar region [M51.36]  Spondylosis without myelopathy or radiculopathy, lumbar region [M47.816]  Payor: Varinder Hunter / Plan: [address] / Product Type: Managed Care Medicare /  Onset Date:20     Prior Hospitalization: see medical history Provider#: [id number]   Medications: Verified on Patient Summary List    Comorbidities: hx of prostate CA, current abdominal hernia, hx of multiple lumbar surgeries   Prior Level of Function: functionally independent, hx of chronic low back pain >15 years, lives in two story home with wife    Visits from Start of Care: 1    Missed Visits: 0    Reporting Period : 20 to 20    Short Term Goals: To be accomplished in 1 week  - Goal: Pt to be compliant with initial HEP to improve lumbar rotation mobility. Status at last note/certification: Established and reviewed with Pt  Current: unable to reassess due to unexpected discharge  - Goal: Pt to initiate aquatics program without increased pain to aid in achievement of all LTGs. Status at last note/certification: N/A  Current: unable to reassess due to unexpected discharge  Long Term Goals: To be accomplished in 10 treatments  - Goal: Pt to report standing tolerance of at least 20 min without pain greater than 5/10 at worst to facilitate improved tolerance for chores. Status at last note/certification: 10 min tolerance  Current: unable to reassess due to unexpected discharge  - Goal: Pt to report receiving restful sleep at least 3 nights per week for improved quality of life.   Status at last note/certification: not receiving restful sleep any night  Current: unable to reassess due to unexpected discharge  - Goal: Pt to report < 7/10 pain at worst to increase ease with ADLs. Status at last note/certification: 59/97 pain at worst  Current: unable to reassess due to unexpected discharge  - Goal: Pt to report FOTO score of at least 57 pts to show improved function and quality of life. Status at last note/certification: FOTO 38 pts   Current: unable to reassess due to unexpected discharge      Assessment/ Summary of Care: The patient attended therapy for initial evaluation session but was referred back to MD for further follow up due to new onset of urinary incontinence symptoms. At this time, he is scheduled for MRI of lower back to assess for any stenotic changes. We will have to discharge his current episode at this time due to 30 day policy but would be happy to work with this patient in the future after MRI follow up.      RECOMMENDATIONS:  [x]Discontinue therapy: []Patient has reached or is progressing toward set goals      [x]Patient is non-compliant or has abdicated      []Due to lack of appreciable progress towards set goals    Vangie Sagastume 10/16/2020 3:38 PM

## 2020-11-10 ENCOUNTER — HOSPITAL ENCOUNTER (OUTPATIENT)
Dept: MRI IMAGING | Age: 65
Discharge: HOME | End: 2020-11-10
Attending: RADIOLOGY | Admitting: RADIOLOGY
Payer: MEDICARE

## 2020-11-10 ENCOUNTER — ANESTHESIA (OUTPATIENT)
Dept: MRI IMAGING | Age: 65
End: 2020-11-10
Payer: MEDICARE

## 2020-11-10 ENCOUNTER — ANESTHESIA EVENT (OUTPATIENT)
Dept: MRI IMAGING | Age: 65
End: 2020-11-10
Payer: MEDICARE

## 2020-11-10 VITALS
TEMPERATURE: 97.9 F | HEART RATE: 72 BPM | BODY MASS INDEX: 39.16 KG/M2 | SYSTOLIC BLOOD PRESSURE: 153 MMHG | RESPIRATION RATE: 18 BRPM | HEIGHT: 70 IN | OXYGEN SATURATION: 97 % | DIASTOLIC BLOOD PRESSURE: 72 MMHG | WEIGHT: 273.5 LBS

## 2020-11-10 VITALS
OXYGEN SATURATION: 99 % | DIASTOLIC BLOOD PRESSURE: 90 MMHG | SYSTOLIC BLOOD PRESSURE: 128 MMHG | HEART RATE: 75 BPM | RESPIRATION RATE: 9 BRPM

## 2020-11-10 LAB
COVID-19 RAPID TEST, COVR: NOT DETECTED
SOURCE, COVRS: NORMAL
SPECIMEN TYPE, XMCV1T: NORMAL

## 2020-11-10 PROCEDURE — 72158 MRI LUMBAR SPINE W/O & W/DYE: CPT

## 2020-11-10 PROCEDURE — 01922 ANES N-INVAS IMG/RADJ THER: CPT | Performed by: ANESTHESIOLOGY

## 2020-11-10 PROCEDURE — 76060000032 HC ANESTHESIA 0.5 TO 1 HR

## 2020-11-10 PROCEDURE — A9575 INJ GADOTERATE MEGLUMI 0.1ML: HCPCS | Performed by: RADIOLOGY

## 2020-11-10 PROCEDURE — 01922 ANES N-INVAS IMG/RADJ THER: CPT | Performed by: NURSE ANESTHETIST, CERTIFIED REGISTERED

## 2020-11-10 PROCEDURE — 87635 SARS-COV-2 COVID-19 AMP PRB: CPT

## 2020-11-10 PROCEDURE — 74011250636 HC RX REV CODE- 250/636: Performed by: ANESTHESIOLOGY

## 2020-11-10 PROCEDURE — 74011000250 HC RX REV CODE- 250: Performed by: NURSE ANESTHETIST, CERTIFIED REGISTERED

## 2020-11-10 PROCEDURE — 74011250636 HC RX REV CODE- 250/636: Performed by: NURSE ANESTHETIST, CERTIFIED REGISTERED

## 2020-11-10 PROCEDURE — 74011636320 HC RX REV CODE- 636/320: Performed by: RADIOLOGY

## 2020-11-10 PROCEDURE — 76210000020 HC REC RM PH II FIRST 0.5 HR

## 2020-11-10 RX ORDER — MIDAZOLAM HYDROCHLORIDE 1 MG/ML
INJECTION, SOLUTION INTRAMUSCULAR; INTRAVENOUS AS NEEDED
Status: DISCONTINUED | OUTPATIENT
Start: 2020-11-10 | End: 2020-11-10 | Stop reason: HOSPADM

## 2020-11-10 RX ORDER — KETAMINE HCL 50MG/ML(1)
SYRINGE (ML) INTRAVENOUS AS NEEDED
Status: DISCONTINUED | OUTPATIENT
Start: 2020-11-10 | End: 2020-11-10 | Stop reason: HOSPADM

## 2020-11-10 RX ORDER — SODIUM CHLORIDE, SODIUM LACTATE, POTASSIUM CHLORIDE, CALCIUM CHLORIDE 600; 310; 30; 20 MG/100ML; MG/100ML; MG/100ML; MG/100ML
25 INJECTION, SOLUTION INTRAVENOUS CONTINUOUS
Status: DISCONTINUED | OUTPATIENT
Start: 2020-11-10 | End: 2020-11-10 | Stop reason: HOSPADM

## 2020-11-10 RX ADMIN — GADOTERATE MEGLUMINE 20 ML: 376.9 INJECTION INTRAVENOUS at 12:45

## 2020-11-10 RX ADMIN — Medication 50 MG: at 11:50

## 2020-11-10 RX ADMIN — MIDAZOLAM 2 MG: 1 INJECTION INTRAMUSCULAR; INTRAVENOUS at 11:50

## 2020-11-10 RX ADMIN — Medication 50 MG: at 12:01

## 2020-11-10 RX ADMIN — SODIUM CHLORIDE, SODIUM LACTATE, POTASSIUM CHLORIDE, AND CALCIUM CHLORIDE: 600; 310; 30; 20 INJECTION, SOLUTION INTRAVENOUS at 11:46

## 2020-11-10 NOTE — DISCHARGE INSTRUCTIONS
Patient Education     DISCHARGE SUMMARY from Nurse    PATIENT INSTRUCTIONS:    After general anesthesia or intravenous sedation, for 24 hours or while taking prescription Narcotics:  · Limit your activities  · Do not drive and operate hazardous machinery  · Do not make important personal or business decisions  · Do  not drink alcoholic beverages  · If you have not urinated within 8 hours after discharge, please contact your surgeon on call. Report the following to your surgeon:  · Excessive pain, swelling, redness or odor of or around the surgical area  · Temperature over 100.5  · Nausea and vomiting lasting longer than 4 hours or if unable to take medications  · Any signs of decreased circulation or nerve impairment to extremity: change in color, persistent  numbness, tingling, coldness or increase pain  · Any questions    What to do at Home:  Recommended activity: Activity as tolerated and no driving for today,     These are general instructions for a healthy lifestyle:    No smoking/ No tobacco products/ Avoid exposure to second hand smoke  Surgeon General's Warning:  Quitting smoking now greatly reduces serious risk to your health. Obesity, smoking, and sedentary lifestyle greatly increases your risk for illness    A healthy diet, regular physical exercise & weight monitoring are important for maintaining a healthy lifestyle    You may be retaining fluid if you have a history of heart failure or if you experience any of the following symptoms:  Weight gain of 3 pounds or more overnight or 5 pounds in a week, increased swelling in our hands or feet or shortness of breath while lying flat in bed. Please call your doctor as soon as you notice any of these symptoms; do not wait until your next office visit. The discharge information has been reviewed with the patient. The patient verbalized understanding.   Discharge medications reviewed with the patient and appropriate educational materials and side effects teaching were provided. Patient armband removed and shredded  ___________________________________________________________________________________________________________________________________Learning About Coronavirus (COVID-19)  Coronavirus (COVID-19): Overview  What is coronavirus ([id number])? The coronavirus disease (COVID-19) is caused by a virus. It is an illness that was first found in [place], [place], in December 2019. It has since spread worldwide. The virus can cause fever, cough, and trouble breathing. In severe cases, it can cause pneumonia and make it hard to breathe without help. It can cause death. Coronaviruses are a large group of viruses. They cause the common cold. They also cause more serious illnesses like Middle East respiratory syndrome (MERS) and severe acute respiratory syndrome (SARS). COVID-19 is caused by a novel coronavirus. That means it's a new type that has not been seen in people before. This virus spreads person-to-person through droplets from coughing and sneezing. It can also spread when you are close to someone who is infected. And it can spread when you touch something that has the virus on it, such as a doorknob or a tabletop. What can you do to protect yourself from coronavirus (COVID-19)? The best way to protect yourself from getting sick is to:  · Avoid areas where there is an outbreak. · Avoid contact with people who may be infected. · Wash your hands often with soap or alcohol-based hand sanitizers. · Avoid crowds and try to stay at least 6 feet away from other people. · Wash your hands often, especially after you cough or sneeze. Use soap and water, and scrub for at least 20 seconds. If soap and water aren't available, use an alcohol-based hand . · Avoid touching your mouth, nose, and eyes. What can you do to avoid spreading the virus to others?   To help avoid spreading the virus to others:  · Cover your mouth with a tissue when you cough or sneeze. Then throw the tissue in the trash. · Use a disinfectant to clean things that you touch often. · Stay home if you are sick or have been exposed to the virus. Don't go to school, work, or public areas. And don't use public transportation. · If you are sick:  ? Leave your home only if you need to get medical care. But call the doctor's office first so they know you're coming. And wear a face mask, if you have one.  ? If you have a face mask, wear it whenever you're around other people. It can help stop the spread of the virus when you cough or sneeze. ? Clean and disinfect your home every day. Use household  and disinfectant wipes or sprays. Take special care to clean things that you grab with your hands. These include doorknobs, remote controls, phones, and handles on your refrigerator and microwave. And don't forget countertops, tabletops, bathrooms, and computer keyboards. When to call for help  Call 911 anytime you think you may need emergency care. For example, call if:  · You have severe trouble breathing. (You can't talk at all.)  · You have constant chest pain or pressure. · You are severely dizzy or lightheaded. · You are confused or can't think clearly. · Your face and lips have a blue color. · You pass out (lose consciousness) or are very hard to wake up. Call your doctor now if you develop symptoms such as:  · Shortness of breath. · Fever. · Cough. If you need to get care, call ahead to the doctor's office for instructions before you go. Make sure you wear a face mask, if you have one, to prevent exposing other people to the virus. Where can you get the latest information? The following health organizations are tracking and studying this virus. Their websites contain the most up-to-date information. Sheila El also learn what to do if you think you may have been exposed to the virus. · U.S. Centers for Disease Control and Prevention (CDC):  The CDC provides updated news about the disease and travel advice. The website also tells you how to prevent the spread of infection. www.cdc.gov  · World Health Organization [facility]): WHO offers information about the virus outbreaks. WHO also has travel advice. www.who.int  Current as of: April 1, 2020               Content Version: 12.4  © 5514-3423 [employer], [employer]orporated. Care instructions adapted under license by your healthcare professional. If you have questions about a medical condition or this instruction, always ask your healthcare professional. [address] any warranty or liability for your use of this information.

## 2020-11-10 NOTE — ANESTHESIA POSTPROCEDURE EVALUATION
* No procedures listed *.     MAC    Anesthesia Post Evaluation      Multimodal analgesia: multimodal analgesia used between 6 hours prior to anesthesia start to PACU discharge  Patient location during evaluation: bedside  Patient participation: complete - patient participated  Level of consciousness: awake  Pain management: adequate  Airway patency: patent  Anesthetic complications: no  Cardiovascular status: stable  Respiratory status: acceptable  Hydration status: acceptable  Post anesthesia nausea and vomiting:  controlled      INITIAL Post-op Vital signs:   Vitals Value Taken Time   /72 11/10/2020 12:50 PM   Temp     Pulse 72 11/10/2020 12:50 PM   Resp 18 11/10/2020 12:50 PM   SpO2 97 % 11/10/2020 12:50 PM

## 2020-11-10 NOTE — ANESTHESIA PREPROCEDURE EVALUATION
Anesthetic History               Review of Systems / Medical History  Patient summary reviewed, nursing notes reviewed and pertinent labs reviewed    Pulmonary    COPD: mild      Smoker         Neuro/Psych              Cardiovascular  Within defined limits                     GI/Hepatic/Renal  Within defined limits              Endo/Other        Morbid obesity and arthritis     Other Findings   Comments: Current Smoker? NO       Elective Surgery? Yes       Abstained from smoking 24 hours prior to anesthesia? N/A    Risk Factors for Postoperative nausea/vomiting:       History of postoperative nausea/vomiting? NO       Female? NO       Motion sickness? NO       Intended opioid administration for postoperative analgesia?   NO           Physical Exam    Airway  Mallampati: II  TM Distance: 4 - 6 cm  Neck ROM: normal range of motion   Mouth opening: Normal     Cardiovascular  Regular rate and rhythm,  S1 and S2 normal,  no murmur, click, rub, or gallop  Rhythm: regular  Rate: normal         Dental  No notable dental hx       Pulmonary  Breath sounds clear to auscultation               Abdominal  GI exam deferred       Other Findings            Anesthetic Plan    ASA: 3  Anesthesia type: MAC          Induction: Intravenous  Anesthetic plan and risks discussed with: Patient

## 2020-11-10 NOTE — PERIOP NOTES
Phase 2 Recovery Summary    Report received from Malka TOMPKINS    Vitals:    11/10/20 1044 11/10/20 1250   BP:  (!) 153/72   Pulse: 83 72   Resp: 20 18   Temp: 98.1 °F (36.7 °C) 97.9 °F (36.6 °C)   SpO2: 94% 97%   Weight: 124.1 kg (273 lb 8 oz)    Height: 5' 10\" (1.778 m)        oriented to time, place, person and situation          Lines and Drains  Peripheral Intravenous Line:  Removed prior to discharg        Patient assisted to chair with minimal assist. Pateint tolerating liquids well. Patient's family at bedside. Discharge discussed with patient and family. No questions or concerns at this time. Patient had time to ask any questions. Discharge medications reviewed with patient and appropriate educational materials and side effects teaching were provided. Patient discharged to home with his mother Ellis Molina without incident.      Agustín Andrew RN

## 2020-11-11 RX ORDER — GABAPENTIN 300 MG/1
CAPSULE ORAL
Qty: 90 CAP | Refills: 0 | Status: SHIPPED | OUTPATIENT
Start: 2020-11-11 | End: 2020-12-14 | Stop reason: DRUGHIGH

## 2020-11-11 NOTE — PROGRESS NOTES
Spoke w/patient regarding MRI results. He is willing to try Gabapentin again. Did not have benefit from ESIs in past.  Not ready for sx.

## 2020-12-14 ENCOUNTER — OFFICE VISIT (OUTPATIENT)
Dept: ORTHOPEDIC SURGERY | Age: 65
End: 2020-12-14
Payer: MEDICARE

## 2020-12-14 VITALS
SYSTOLIC BLOOD PRESSURE: 144 MMHG | DIASTOLIC BLOOD PRESSURE: 88 MMHG | WEIGHT: 278 LBS | BODY MASS INDEX: 39.89 KG/M2 | TEMPERATURE: 97.4 F | HEART RATE: 82 BPM | RESPIRATION RATE: 12 BRPM

## 2020-12-14 DIAGNOSIS — M54.6 THORACIC SPINE PAIN: ICD-10-CM

## 2020-12-14 DIAGNOSIS — D17.79 EPIDURAL LIPOMATOSIS: Primary | ICD-10-CM

## 2020-12-14 PROCEDURE — G8510 SCR DEP NEG, NO PLAN REQD: HCPCS | Performed by: PHYSICAL MEDICINE & REHABILITATION

## 2020-12-14 PROCEDURE — G8417 CALC BMI ABV UP PARAM F/U: HCPCS | Performed by: PHYSICAL MEDICINE & REHABILITATION

## 2020-12-14 PROCEDURE — 3017F COLORECTAL CA SCREEN DOC REV: CPT | Performed by: PHYSICAL MEDICINE & REHABILITATION

## 2020-12-14 PROCEDURE — 99213 OFFICE O/P EST LOW 20 MIN: CPT | Performed by: PHYSICAL MEDICINE & REHABILITATION

## 2020-12-14 PROCEDURE — G8428 CUR MEDS NOT DOCUMENT: HCPCS | Performed by: PHYSICAL MEDICINE & REHABILITATION

## 2020-12-14 RX ORDER — GABAPENTIN 400 MG/1
400 CAPSULE ORAL 3 TIMES DAILY
Qty: 270 CAP | Refills: 1 | Status: SHIPPED | OUTPATIENT
Start: 2020-12-14 | End: 2022-06-16
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
## 2020-12-14 NOTE — PATIENT INSTRUCTIONS
Low Back Pain: Exercises  Introduction  Here are some examples of exercises for you to try. The exercises may be suggested for a condition or for rehabilitation. Start each exercise slowly. Ease off the exercises if you start to have pain. You will be told when to start these exercises and which ones will work best for you. How to do the exercises  Press-up   1. Lie on your stomach, supporting your body with your forearms. 2. Press your elbows down into the floor to raise your upper back. As you do this, relax your stomach muscles and allow your back to arch without using your back muscles. As your press up, do not let your hips or pelvis come off the floor. 3. Hold for 15 to 30 seconds, then relax. 4. Repeat 2 to 4 times. Alternate arm and leg (bird dog) exercise   Do this exercise slowly. Try to keep your body straight at all times, and do not let one hip drop lower than the other. 1. Start on the floor, on your hands and knees. 2. Tighten your belly muscles. 3. Raise one leg off the floor, and hold it straight out behind you. Be careful not to let your hip drop down, because that will twist your trunk. 4. Hold for about 6 seconds, then lower your leg and switch to the other leg. 5. Repeat 8 to 12 times on each leg. 6. Over time, work up to holding for 10 to 30 seconds each time. 7. If you feel stable and secure with your leg raised, try raising the opposite arm straight out in front of you at the same time. Knee-to-chest exercise   1. Lie on your back with your knees bent and your feet flat on the floor. 2. Bring one knee to your chest, keeping the other foot flat on the floor (or keeping the other leg straight, whichever feels better on your lower back). 3. Keep your lower back pressed to the floor. Hold for at least 15 to 30 seconds. 4. Relax, and lower the knee to the starting position. 5. Repeat with the other leg. Repeat 2 to 4 times with each leg.   6. To get more stretch, put your other leg flat on the floor while pulling your knee to your chest.    Curl-ups   1. Lie on the floor on your back with your knees bent at a 90-degree angle. Your feet should be flat on the floor, about 12 inches from your buttocks. 2. Cross your arms over your chest. If this bothers your neck, try putting your hands behind your neck (not your head), with your elbows spread apart. 3. Slowly tighten your belly muscles and raise your shoulder blades off the floor. 4. Keep your head in line with your body, and do not press your chin to your chest.  5. Hold this position for 1 or 2 seconds, then slowly lower yourself back down to the floor. 6. Repeat 8 to 12 times. Pelvic tilt exercise   1. Lie on your back with your knees bent. 2. \"Brace\" your stomach. This means to tighten your muscles by pulling in and imagining your belly button moving toward your spine. You should feel like your back is pressing to the floor and your hips and pelvis are rocking back. 3. Hold for about 6 seconds while you breathe smoothly. 4. Repeat 8 to 12 times. Heel dig bridging   1. Lie on your back with both knees bent and your ankles bent so that only your heels are digging into the floor. Your knees should be bent about 90 degrees. 2. Then push your heels into the floor, squeeze your buttocks, and lift your hips off the floor until your shoulders, hips, and knees are all in a straight line. 3. Hold for about 6 seconds as you continue to breathe normally, and then slowly lower your hips back down to the floor and rest for up to 10 seconds. 4. Do 8 to 12 repetitions. Hamstring stretch in doorway   1. Lie on your back in a doorway, with one leg through the open door. 2. Slide your leg up the wall to straighten your knee. You should feel a gentle stretch down the back of your leg. 3. Hold the stretch for at least 15 to 30 seconds. Do not arch your back, point your toes, or bend either knee.  Keep one heel touching the floor and the other heel touching the wall. 4. Repeat with your other leg. 5. Do 2 to 4 times for each leg. Hip flexor stretch   1. Kneel on the floor with one knee bent and one leg behind you. Place your forward knee over your foot. Keep your other knee touching the floor. 2. Slowly push your hips forward until you feel a stretch in the upper thigh of your rear leg. 3. Hold the stretch for at least 15 to 30 seconds. Repeat with your other leg. 4. Do 2 to 4 times on each side. Wall sit   1. Stand with your back 10 to 12 inches away from a wall. 2. Lean into the wall until your back is flat against it. 3. Slowly slide down until your knees are slightly bent, pressing your lower back into the wall. 4. Hold for about 6 seconds, then slide back up the wall. 5. Repeat 8 to 12 times. Follow-up care is a key part of your treatment and safety. Be sure to make and go to all appointments, and call your doctor if you are having problems. It's also a good idea to know your test results and keep a list of the medicines you take. Where can you learn more? Go to http://www.gray.com/  Enter W875 in the search box to learn more about \"Low Back Pain: Exercises. \"  Current as of: March 2, 2020               Content Version: 12.6  © 9398-9229 [employer]. Care instructions adapted under license by [employer] (which disclaims liability or warranty for this information). If you have questions about a medical condition or this instruction, always ask your healthcare professional. [place] [address] any warranty or liability for your use of this information. Back Stretches: Exercises  Introduction  Here are some examples of exercises for stretching your back. Start each exercise slowly. Ease off the exercise if you start to have pain.   Your doctor or physical therapist will tell you when you can start these exercises and which ones will work best for you.  How to do the exercises  Overhead stretch   1. Stand comfortably with your feet shoulder-width apart. 2. Looking straight ahead, raise both arms over your head and reach toward the ceiling. Do not allow your head to tilt back. 3. Hold for 15 to 30 seconds, then lower your arms to your sides. 4. Repeat 2 to 4 times. Side stretch   1. Stand comfortably with your feet shoulder-width apart. 2. Raise one arm over your head, and then lean to the other side. 3. Slide your hand down your leg as you let the weight of your arm gently stretch your side muscles. Hold for 15 to 30 seconds. 4. Repeat 2 to 4 times on each side. Press-up   1. Lie on your stomach, supporting your body with your forearms. 2. Press your elbows down into the floor to raise your upper back. As you do this, relax your stomach muscles and allow your back to arch without using your back muscles. As your press up, do not let your hips or pelvis come off the floor. 3. Hold for 15 to 30 seconds, then relax. 4. Repeat 2 to 4 times. Relax and rest   1. Lie on your back with a rolled towel under your neck and a pillow under your knees. Extend your arms comfortably to your sides. 2. Relax and breathe normally. 3. Remain in this position for about 10 minutes. 4. If you can, do this 2 or 3 times each day. Follow-up care is a key part of your treatment and safety. Be sure to make and go to all appointments, and call your doctor if you are having problems. It's also a good idea to know your test results and keep a list of the medicines you take. Where can you learn more? Go to http://www.huerta.com/  Enter Y090 in the search box to learn more about \"Back Stretches: Exercises. \"  Current as of: March 2, 2020               Content Version: 12.6  © 6667-8629 [employer]. Care instructions adapted under license by [employer] (which disclaims liability or warranty for this information).  If you have questions about a medical condition or this instruction, always ask your healthcare professional. [place] [address] any warranty or liability for your use of this information.

## 2021-01-27 ENCOUNTER — OFFICE VISIT (OUTPATIENT)
Dept: ORTHOPEDIC SURGERY | Age: 66
End: 2021-01-27
Payer: MEDICARE

## 2021-01-27 VITALS
BODY MASS INDEX: 39.57 KG/M2 | SYSTOLIC BLOOD PRESSURE: 149 MMHG | DIASTOLIC BLOOD PRESSURE: 93 MMHG | HEART RATE: 64 BPM | WEIGHT: 275.8 LBS | TEMPERATURE: 97.6 F

## 2021-01-27 DIAGNOSIS — D17.79 EPIDURAL LIPOMATOSIS: Primary | ICD-10-CM

## 2021-01-27 DIAGNOSIS — J44.9 CHRONIC OBSTRUCTIVE PULMONARY DISEASE, UNSPECIFIED COPD TYPE (HCC): ICD-10-CM

## 2021-01-27 PROCEDURE — G8432 DEP SCR NOT DOC, RNG: HCPCS | Performed by: PHYSICAL MEDICINE & REHABILITATION

## 2021-01-27 PROCEDURE — G8417 CALC BMI ABV UP PARAM F/U: HCPCS | Performed by: PHYSICAL MEDICINE & REHABILITATION

## 2021-01-27 PROCEDURE — 3017F COLORECTAL CA SCREEN DOC REV: CPT | Performed by: PHYSICAL MEDICINE & REHABILITATION

## 2021-01-27 PROCEDURE — G8427 DOCREV CUR MEDS BY ELIG CLIN: HCPCS | Performed by: PHYSICAL MEDICINE & REHABILITATION

## 2021-01-27 PROCEDURE — 99214 OFFICE O/P EST MOD 30 MIN: CPT | Performed by: PHYSICAL MEDICINE & REHABILITATION

## 2021-01-27 RX ORDER — TIZANIDINE 2 MG/1
TABLET ORAL
Qty: 60 TAB | Refills: 0 | Status: SHIPPED | OUTPATIENT
Start: 2021-01-27 | End: 2022-06-16

## 2021-01-27 NOTE — PATIENT INSTRUCTIONS
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
## 2021-02-04 ENCOUNTER — DOCUMENTATION ONLY (OUTPATIENT)
Dept: PULMONOLOGY | Age: 66
End: 2021-02-04

## 2021-02-04 NOTE — PROGRESS NOTES
Lf vm to sched np appt per Dr. Guillermina Flowers/COPD - covid negative 11/10/20 in [address] [place] [address]

## 2021-02-10 ENCOUNTER — TRANSCRIBE ORDER (OUTPATIENT)
Dept: SCHEDULING | Age: 66
End: 2021-02-10

## 2021-02-10 DIAGNOSIS — R49.0 HOARSENESS, PERSISTENT: Primary | ICD-10-CM

## 2021-02-26 ENCOUNTER — HOSPITAL ENCOUNTER (OUTPATIENT)
Dept: CT IMAGING | Age: 66
Discharge: HOME | End: 2021-02-26
Attending: NURSE PRACTITIONER
Payer: MEDICARE

## 2021-02-26 DIAGNOSIS — R49.0 HOARSENESS, PERSISTENT: ICD-10-CM

## 2021-02-26 PROCEDURE — 70490 CT SOFT TISSUE NECK W/O DYE: CPT

## 2021-03-23 ENCOUNTER — TRANSCRIBE ORDER (OUTPATIENT)
Dept: SCHEDULING | Age: 66
End: 2021-03-23

## 2021-03-23 DIAGNOSIS — R13.10 DYSPHAGIA: Primary | ICD-10-CM

## 2021-03-30 PROBLEM — C32.9 LARYNGEAL SQUAMOUS CELL CARCINOMA (HCC): Status: ACTIVE | Noted: 2021-03-25

## 2021-05-05 ENCOUNTER — APPOINTMENT (OUTPATIENT)
Dept: RADIATION THERAPY | Age: 66
End: 2021-05-05

## 2022-02-01 ENCOUNTER — HOSPITAL ENCOUNTER (OUTPATIENT)
Dept: GENERAL RADIOLOGY | Age: 67
Discharge: HOME | End: 2022-02-01
Payer: MEDICARE

## 2022-02-01 DIAGNOSIS — M54.2 NECK PAIN: ICD-10-CM

## 2022-02-01 DIAGNOSIS — M54.50 LUMBAR PAIN: ICD-10-CM

## 2022-02-01 DIAGNOSIS — M54.6 THORACIC BACK PAIN: ICD-10-CM

## 2022-02-01 PROCEDURE — 72070 X-RAY EXAM THORAC SPINE 2VWS: CPT

## 2022-02-01 PROCEDURE — 72040 X-RAY EXAM NECK SPINE 2-3 VW: CPT

## 2022-02-01 PROCEDURE — 72100 X-RAY EXAM L-S SPINE 2/3 VWS: CPT

## 2022-03-18 PROBLEM — E66.01 SEVERE OBESITY (HCC): Status: ACTIVE | Noted: 2018-11-28

## 2022-03-18 PROBLEM — C32.9 LARYNGEAL SQUAMOUS CELL CARCINOMA (HCC): Status: ACTIVE | Noted: 2021-03-25

## 2022-05-11 ENCOUNTER — OFFICE VISIT (OUTPATIENT)
Dept: ORTHOPEDIC SURGERY | Age: 67
End: 2022-05-11
Payer: MEDICARE

## 2022-05-11 VITALS
HEART RATE: 64 BPM | OXYGEN SATURATION: 94 % | TEMPERATURE: 97.1 F | BODY MASS INDEX: 34.36 KG/M2 | HEIGHT: 70 IN | WEIGHT: 240 LBS

## 2022-05-11 DIAGNOSIS — S22.060A COMPRESSION FRACTURE OF T7 VERTEBRA, INITIAL ENCOUNTER (HCC): ICD-10-CM

## 2022-05-11 DIAGNOSIS — F41.8 TEST ANXIETY: ICD-10-CM

## 2022-05-11 DIAGNOSIS — S22.060A COMPRESSION FRACTURE OF T8 VERTEBRA, INITIAL ENCOUNTER (HCC): ICD-10-CM

## 2022-05-11 DIAGNOSIS — S22.060A COMPRESSION FRACTURE OF T7 VERTEBRA, INITIAL ENCOUNTER (HCC): Primary | ICD-10-CM

## 2022-05-11 DIAGNOSIS — S22.000A COMPRESSION FRACTURE OF BODY OF THORACIC VERTEBRA (HCC): ICD-10-CM

## 2022-05-11 PROCEDURE — G8536 NO DOC ELDER MAL SCRN: HCPCS | Performed by: NURSE PRACTITIONER

## 2022-05-11 PROCEDURE — G8417 CALC BMI ABV UP PARAM F/U: HCPCS | Performed by: NURSE PRACTITIONER

## 2022-05-11 PROCEDURE — G8427 DOCREV CUR MEDS BY ELIG CLIN: HCPCS | Performed by: NURSE PRACTITIONER

## 2022-05-11 PROCEDURE — 99213 OFFICE O/P EST LOW 20 MIN: CPT | Performed by: NURSE PRACTITIONER

## 2022-05-11 PROCEDURE — G8432 DEP SCR NOT DOC, RNG: HCPCS | Performed by: NURSE PRACTITIONER

## 2022-05-11 PROCEDURE — 1101F PT FALLS ASSESS-DOCD LE1/YR: CPT | Performed by: NURSE PRACTITIONER

## 2022-05-11 PROCEDURE — 3017F COLORECTAL CA SCREEN DOC REV: CPT | Performed by: NURSE PRACTITIONER

## 2022-05-11 RX ORDER — DIAZEPAM 5 MG/1
TABLET ORAL
Qty: 2 TABLET | Refills: 0 | Status: SHIPPED
Start: 2022-05-11 | End: 2022-06-16
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
## 2022-05-11 NOTE — PROGRESS NOTES
Becky Chow presents today for   Chief Complaint   Patient presents with    Back Pain       Is someone accompanying this pt? yes    Is the patient using any DME equipment during OV? no    Depression Screening:  3 most recent PHQ Screens 12/14/2020   PHQ Not Done -   Little interest or pleasure in doing things Not at all   Feeling down, depressed, irritable, or hopeless Not at all   Total Score PHQ 2 0       Learning Assessment:  Learning Assessment 6/29/2017   PRIMARY LEARNER Patient   PRIMARY LANGUAGE ENGLISH   LEARNER PREFERENCE PRIMARY LISTENING   ANSWERED BY Patient    RELATIONSHIP SELF       Abuse Screening:  Abuse Screening Questionnaire 5/1/2019   Do you ever feel afraid of your partner? N   Are you in a relationship with someone who physically or mentally threatens you? N   Is it safe for you to go home? Y       Fall Risk  Fall Risk Assessment, last 12 mths 5/1/2019   Able to walk? Yes   Fall in past 12 months? No       OPIOID RISK TOOL  No flowsheet data found. Coordination of Care:  1. Have you been to the ER, urgent care clinic since your last visit? Yes 4/ 2022 g tube replace Hospitalized since your last visit? no    2. Have you seen or consulted any other health care providers outside of the [address] [place] [address] since your last visit? no Include any pap smears or colon screening.  no

## 2022-06-03 ENCOUNTER — HOSPITAL ENCOUNTER (OUTPATIENT)
Age: 67
Discharge: HOME | End: 2022-06-03
Attending: NURSE PRACTITIONER
Payer: MEDICARE

## 2022-06-03 PROCEDURE — 72146 MRI CHEST SPINE W/O DYE: CPT

## 2022-06-16 ENCOUNTER — OFFICE VISIT (OUTPATIENT)
Dept: ORTHOPEDIC SURGERY | Age: 67
End: 2022-06-16

## 2022-06-16 VITALS — BODY MASS INDEX: 35.87 KG/M2 | OXYGEN SATURATION: 95 % | WEIGHT: 250 LBS | HEART RATE: 75 BPM | TEMPERATURE: 96.5 F

## 2022-06-16 DIAGNOSIS — D17.79 EPIDURAL LIPOMATOSIS: ICD-10-CM

## 2022-06-16 DIAGNOSIS — Z93.0 TRACHEOSTOMY STATUS (HCC): ICD-10-CM

## 2022-06-16 DIAGNOSIS — M47.814 THORACIC SPONDYLOSIS: Primary | ICD-10-CM

## 2022-06-16 PROCEDURE — G8427 DOCREV CUR MEDS BY ELIG CLIN: HCPCS | Performed by: PHYSICAL MEDICINE & REHABILITATION

## 2022-06-16 PROCEDURE — G8432 DEP SCR NOT DOC, RNG: HCPCS | Performed by: PHYSICAL MEDICINE & REHABILITATION

## 2022-06-16 PROCEDURE — G8536 NO DOC ELDER MAL SCRN: HCPCS | Performed by: PHYSICAL MEDICINE & REHABILITATION

## 2022-06-16 PROCEDURE — 1101F PT FALLS ASSESS-DOCD LE1/YR: CPT | Performed by: PHYSICAL MEDICINE & REHABILITATION

## 2022-06-16 PROCEDURE — G8417 CALC BMI ABV UP PARAM F/U: HCPCS | Performed by: PHYSICAL MEDICINE & REHABILITATION

## 2022-06-16 PROCEDURE — 3017F COLORECTAL CA SCREEN DOC REV: CPT | Performed by: PHYSICAL MEDICINE & REHABILITATION

## 2022-06-16 PROCEDURE — 99214 OFFICE O/P EST MOD 30 MIN: CPT | Performed by: PHYSICAL MEDICINE & REHABILITATION

## 2022-06-16 PROCEDURE — 1123F ACP DISCUSS/DSCN MKR DOCD: CPT | Performed by: PHYSICAL MEDICINE & REHABILITATION

## 2022-06-16 RX ORDER — ACETAMINOPHEN 160 MG/5ML
5 LIQUID ORAL
COMMUNITY

## 2022-06-16 RX ORDER — TIZANIDINE 2 MG/1
2 TABLET ORAL
Qty: 60 TABLET | Refills: 2 | Status: SHIPPED | OUTPATIENT
Start: 2022-06-16 | End: 2022-09-07 | Stop reason: SDUPTHER
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
## 2022-06-16 NOTE — LETTER
6/17/2022    Patient: Shalini Grewal   YOB: 1955   Date of Visit: 6/16/2022     Thomas Miranda, [address]  [address] [facility] [address]  Via Fax: [phone]    Dear Thomas Miranda, NP,      Thank you for referring Mr. Lux Wellington to [address] [place] [address] for evaluation. My notes for this consultation are attached. If you have questions, please do not hesitate to call me. I look forward to following your patient along with you.       Sincerely,    Kristal Crystal MD

## 2022-06-21 ENCOUNTER — TELEPHONE (OUTPATIENT)
Dept: ORTHOPEDIC SURGERY | Age: 67
End: 2022-06-21

## 2022-06-21 DIAGNOSIS — M47.814 THORACIC SPONDYLOSIS: Primary | ICD-10-CM

## 2022-06-21 DIAGNOSIS — Z98.1 HX OF SPINAL FUSION: ICD-10-CM

## 2022-06-21 DIAGNOSIS — Z93.0 TRACHEOSTOMY STATUS (HCC): ICD-10-CM

## 2022-06-21 NOTE — TELEPHONE ENCOUNTER
Please pend up order for Dr Elida Hayward to sign.     Dr. Carito Verma note states \" needs postural reeducation, strengthening, and range of motion\" due to kyphosis

## 2022-06-21 NOTE — TELEPHONE ENCOUNTER
Thelma Eagle (emegency contact) called on behalf of patient to request we order physical therapy as discussed at last office visit. She said the [facility] on [facility] is most convenient. She called for patient as he has a trach and has difficulty speaking for long periods of time. She/he can be contacted for scheduling at [phone].

## 2022-06-29 NOTE — TELEPHONE ENCOUNTER
Please pend up ref to home health for PT for Dr Cotto Many to sign. Per Dr Atif Michael note pt needs postural reeducation, strengthening, and range of motion.

## 2022-06-29 NOTE — TELEPHONE ENCOUNTER
Called patients wife Jah Cortez([facility]) and informed ehr that the new order for home health physical therapy has been entered. She stated her and  has both been sick and has been staying apart for now and her number is the best contact number [phone]. He has a trach and and does not speak on the mobile number listed and the home number is no longer in use. I informed her that I would update the information to make sure they reach out to her. She verbalized understanding.
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
## 2022-07-04 ENCOUNTER — HOME HEALTH ADMISSION (OUTPATIENT)
Dept: HOME HEALTH SERVICES | Facility: HOME HEALTH | Age: 67
End: 2022-07-04
Payer: MEDICARE

## 2022-07-05 ENCOUNTER — HOME CARE VISIT (OUTPATIENT)
Dept: SCHEDULING | Facility: HOME HEALTH | Age: 67
End: 2022-07-05
Payer: MEDICARE

## 2022-07-05 VITALS
SYSTOLIC BLOOD PRESSURE: 112 MMHG | OXYGEN SATURATION: 97 % | RESPIRATION RATE: 12 BRPM | HEART RATE: 60 BPM | TEMPERATURE: 96.8 F | DIASTOLIC BLOOD PRESSURE: 70 MMHG

## 2022-07-05 PROCEDURE — 400013 HH SOC

## 2022-07-05 PROCEDURE — G0151 HHCP-SERV OF PT,EA 15 MIN: HCPCS
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
## 2022-07-05 NOTE — CASE COMMUNICATION
PT admission visit completed. Frequency planned: 2w4, 1w1. OT eval pending.   Thank you for your referral.

## 2022-07-06 ENCOUNTER — HOME CARE VISIT (OUTPATIENT)
Dept: HOME HEALTH SERVICES | Facility: HOME HEALTH | Age: 67
End: 2022-07-06
Payer: MEDICARE

## 2022-07-08 ENCOUNTER — HOME CARE VISIT (OUTPATIENT)
Dept: SCHEDULING | Facility: HOME HEALTH | Age: 67
End: 2022-07-08
Payer: MEDICARE

## 2022-07-08 PROCEDURE — G0157 HHC PT ASSISTANT EA 15: HCPCS

## 2022-07-11 ENCOUNTER — HOME CARE VISIT (OUTPATIENT)
Dept: SCHEDULING | Facility: HOME HEALTH | Age: 67
End: 2022-07-11
Payer: MEDICARE

## 2022-07-11 VITALS
SYSTOLIC BLOOD PRESSURE: 110 MMHG | DIASTOLIC BLOOD PRESSURE: 72 MMHG | OXYGEN SATURATION: 98 % | TEMPERATURE: 98 F | HEART RATE: 67 BPM

## 2022-07-11 VITALS
SYSTOLIC BLOOD PRESSURE: 130 MMHG | DIASTOLIC BLOOD PRESSURE: 85 MMHG | TEMPERATURE: 98 F | OXYGEN SATURATION: 97 % | HEART RATE: 70 BPM

## 2022-07-11 PROCEDURE — G0157 HHC PT ASSISTANT EA 15: HCPCS

## 2022-07-11 NOTE — HOME HEALTH
S: Pt denies falls or changes in medication, c/o 7/10 pain back pain Thoracic area  CAREGIVER PARTICIPATION: Pt is Ind with ADLs  O: TRANSFERS: Ind sit to stand transfers from multiple surfaces  THEREX/HEP: Pt instructed in shoulder shrugs, scapular protraction/retraction, shoulder rotation, wall stretch x 10 reps x 1 set    GAIT: Pt amb 200ft Ind outside on uneven surfaces no AD decreaed sb    STAIRS: Pt negotiates front steps to exit/enter home with step to gait   EDUCATION: Pt instructed to perform HEP 3x day, instructed in pain mangegemnt tech   A: RESPONSE TO EDUCATION: Pt able to return demonstrate HEP, able to teach back pain mangement tech    RESPONSE TO TX: Pt tolerated increaed amb dist outside on unevn surfaces RPE 6, no change in pain level   P: Next visit will address pain/pain management, pt will ambulate 200ft or greater with decreaed fatigue reported will review HEP

## 2022-07-11 NOTE — HOME HEALTH
S: Pt denies falls or changes in medication, c/o 8/10 pain back pain Thoracic/Cervical area  CAREGIVER PARTICIPATION: Pt is Ind with ADLs  O: TRANSFERS: Ind sit to stand transfers from multiple surfaces  THEREX/HEP: Pt instructed in shoulder shrugs, scapular protraction/retraction, shoulder rotation, wall stretch, trunk rotation with arms across chest, trunk extension  x 10 reps x 1 set    GAIT: Pt amb 4 minutes on even surfaces no AD     EDUCATION: Pt instructed to perform HEP 3x day, reviewed pain management  A: RESPONSE TO EDUCATION: Pt able to return demonstrate HEP, able to teach back pain mangement   RESPONSE TO TX: Pt tolerated additional exercises pain increased to 9/10  P: Next visit will address pain/pain management, pt will amb x 5 minutes or 300ft on even/uneven surfaces, will demonstrate improved Tinetti x 3-5 points

## 2022-07-13 ENCOUNTER — HOME CARE VISIT (OUTPATIENT)
Dept: SCHEDULING | Facility: HOME HEALTH | Age: 67
End: 2022-07-13
Payer: MEDICARE

## 2022-07-13 PROCEDURE — G0157 HHC PT ASSISTANT EA 15: HCPCS

## 2022-07-14 ENCOUNTER — HOME CARE VISIT (OUTPATIENT)
Dept: SCHEDULING | Facility: HOME HEALTH | Age: 67
End: 2022-07-14
Payer: MEDICARE

## 2022-07-14 VITALS
TEMPERATURE: 97.4 F | HEART RATE: 90 BPM | OXYGEN SATURATION: 94 % | SYSTOLIC BLOOD PRESSURE: 122 MMHG | DIASTOLIC BLOOD PRESSURE: 71 MMHG

## 2022-07-14 VITALS
DIASTOLIC BLOOD PRESSURE: 86 MMHG | OXYGEN SATURATION: 96 % | TEMPERATURE: 98.4 F | SYSTOLIC BLOOD PRESSURE: 119 MMHG | HEART RATE: 56 BPM

## 2022-07-14 PROCEDURE — G0152 HHCP-SERV OF OT,EA 15 MIN: HCPCS

## 2022-07-14 NOTE — HOME HEALTH
S: Pt denies falls or changes in medication, c/o 7/10 pain back pain Thoracic/Cervical area  CAREGIVER PARTICIPATION: Pt is Ind with ADLs  O: TRANSFERS: Ind sit to stand transfers from multiple surfaces in home   THEREX/HEP: Pt instructed in shoulder shrugs, scapular protraction/retraction, shoulder rotation, wall stretch, trunk rotation with arms across chest, trunk extension  x 15 reps x 1 set    Massage to Thoracic area   TINETTI: 27/28  FTSTS: 19  EDUCATION: Pt instructed to perform HEP 3x day, reviewed pain management  A: RESPONSE TO EDUCATION: Pt able to return demonstrate HEP, no increase inpain reported follow tx session, Tinetti increased by 3 points   RESPONSE TO TX: Pt progressing well toward functional goals, no change in pain reported pt instructed to contact MDs appt regarding pain   P: Next visit will address pain/pain management, pt will demostrate increased endurance evident by ablility to magda 5 minutes of amb on even surfaces, will demonstrate Ind in HEP

## 2022-07-14 NOTE — Clinical Note
Therapy Functional Score Assessment  Question                                            Score   Grooming                            1       Upper Dressing   2      Lower Dressing   2      Bathing                 2      Toilet Transfer                   1    Transfer                1            Ambulation                         3   Dyspnea                     3       Pain Interfering with activity        4  Est number therapy visits      5    Mr. Solomon Bamberger presents with good rehab potential to increase his endurance, safety, and strength for increased independence with ADLs/IADLs and functional mobility. Pt currently with increased pain and frustration with completion of upper body and lower body dressing. Pt with supervision for safety with house hold mobility and elderly mothers assistance for IADLs. Pt is agreeable to continued home health OT services to progress his safe independence within his home. PLAN: D/C 1w1, 2w2 with plans to discharge when goals are met or maximal potential achieved.

## 2022-07-14 NOTE — Clinical Note
Mr. Chio Israel presents with good rehab potential to increase his endurance, safety, and strength for increased independence with ADLs/IADLs and functional mobility. Pt currently with increased pain and frustration with completion of upper body and lower body dressing. Pt with supervision for safety with house hold mobility and elderly mothers assistance for IADLs. Pt is agreeable to continued home health OT services to progress his safe independence within his home. PLAN: D/C 1w1, 2w2 with plans to discharge when goals are met or maximal potential achieved.

## 2022-07-14 NOTE — Clinical Note
Pt with chronic back pain and pain with trache from throat cancer. Please address adaptive techniques for dressing, IADLs and funcitonal mobitliy for reducing his back pain flares. Pt with decreased endurance and would benift from energy conservation technqiue implamentaiton. Pt uses PEG tub for feeding and does not need assistance with meal prep. Please address IADLs of cleaning to reduce caregiver burden of his elderly mother. Progress BUE HEP within hid functional ranges with BUE shoulder arthritits with BUE for continued mobility of his BUE while reducing stiffness/pain. Please reach out with any questions or concerns.
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
## 2022-07-19 ENCOUNTER — HOME CARE VISIT (OUTPATIENT)
Dept: SCHEDULING | Facility: HOME HEALTH | Age: 67
End: 2022-07-19
Payer: MEDICARE

## 2022-07-19 VITALS
DIASTOLIC BLOOD PRESSURE: 72 MMHG | TEMPERATURE: 97.7 F | OXYGEN SATURATION: 97 % | SYSTOLIC BLOOD PRESSURE: 116 MMHG | HEART RATE: 55 BPM

## 2022-07-19 PROCEDURE — G0158 HHC OT ASSISTANT EA 15: HCPCS

## 2022-07-19 PROCEDURE — G0157 HHC PT ASSISTANT EA 15: HCPCS

## 2022-07-19 NOTE — HOME HEALTH
S: Pt denies falls or changes in medication, c/o 8/10 pain back pain Thoracic/Cervical area  CAREGIVER PARTICIPATION: Pt is Ind with ADLs  O: TRANSFERS: Ind sit to stand transfers from multiple surfaces in home   THEREX/HEP: Pt instructed in shoulder shrugs, scapular protraction/retraction, shoulder rotation, wall stretch, trunk rotation with arms across chest, trunk extension  x 15 reps x 1 set    Massage to Thoracic area   TINETTI: 28/28  FTSTS: 20 sec   Gait tng x 5 minutes on even surfaces due to high temps no AD good stride length, sb and posture   EDUCATION: Pt instructed to perform HEP 3x day, reviewed pain management  A: RESPONSE TO EDUCATION: pt has been compliant with HEP, pt has appointment with pain management 8/3/2022  RESPONSE TO TX: Pt progressing well toward goals, demonstrates improved endurance evident by ability to tolerate 5 minutes of ambultiaon RPE 5, improved LE strength FTSTS 20 pain goal is unresolved. P: Next visit will address pain/pain management, pt will demostrate increased endurance evident by ablility to magda 5 minutes of amb on even surfaces, will demonstrate Ind in HEP                 BSHSI TINETTI BALANCE AND GAIT        BALANCE SCORE: Intitial Instructions: subject is seated on a hard, armless chair.

## 2022-07-20 VITALS
DIASTOLIC BLOOD PRESSURE: 80 MMHG | OXYGEN SATURATION: 96 % | RESPIRATION RATE: 18 BRPM | SYSTOLIC BLOOD PRESSURE: 122 MMHG | TEMPERATURE: 98 F

## 2022-07-20 NOTE — HOME HEALTH
SUBJECTIVE: Pt reports he has been able to complete ADL/IADLs ind  . CAREGIVER INVOLVEMENT/ASSISTANCE NEEDED FOR: Pt has family who assists with community IADLs  . HOME HEALTH SUPPLIES BY TYPE AND QUANTITY ORDERED/DELIVERED THIS VISIT INCLUDE: NONE   . OBJECTIVE:  See interventions. .  Patient education provided this visit: Pt has been educated on energy conservation techniques  . Patient level of understanding of education provided: Pt in agreeance to education provided by VANESSA. Vilma [facility] Home exercise program: Shoulder flexion, shoulder abduction, horizontal adduction, elbow flexion, elbow extension, Forearm pronation and supination 10 reps X 2 sets with using water bottle  . RESPONSE TO TREATMENT: Pt had a positive response to treatment with no increased complaints of pain or fatigue  . ASSESSMENT OF PROGRESS TOWARD GOALS: Pt able to complete upper and lower body dressing with supervision. Pt demonstrates good balance and safety during item retrieval as needed for ADLs and IADLs  . CONTINUED NEED FOR THE FOLLOWING SKILLS: HH OT is medically necessary to address decreased functional strength,  decreased independence and safety with functional transfers, decreased independence and safety performing ADL/IADL tasks, decreased functional endurance, and impaired balance in order to improve functional independence, obtain set goals, reduce risk of falls, improve quality of life, and return to PLOF. [facility] PLAN FOR NEXT VISIT:MENDEZ will address ADL training with use of AE   . THE FOLLOWING DISCHARGE PLANNING WAS DISCUSSED WITH THE PATIENT/CAREGIVER: Discharge to self and family under MD supervision once all goals have been met or patient has reached maximum potential.  Frequency remaining: 1X1, 2X1.  Possible DC on following visit per pt request

## 2022-07-22 ENCOUNTER — HOME CARE VISIT (OUTPATIENT)
Dept: SCHEDULING | Facility: HOME HEALTH | Age: 67
End: 2022-07-22
Payer: MEDICARE

## 2022-07-22 ENCOUNTER — HOME CARE VISIT (OUTPATIENT)
Dept: HOME HEALTH SERVICES | Facility: HOME HEALTH | Age: 67
End: 2022-07-22
Payer: MEDICARE

## 2022-07-22 PROCEDURE — G0152 HHCP-SERV OF OT,EA 15 MIN: HCPCS

## 2022-07-22 PROCEDURE — G0158 HHC OT ASSISTANT EA 15: HCPCS

## 2022-07-22 NOTE — HOME HEALTH
SUBJECTIVE: Pt reports he had trach replaced X 2 yesterday returning to original device due to new device being uncomfortable. Pt report he is ind with self care tasks and requested DC from [facility] OT  . CAREGIVER INVOLVEMENT/ASSISTANCE NEEDED FOR: Pt family assists with IADLs as needed  . HOME HEALTH SUPPLIES BY TYPE AND QUANTITY ORDERED/DELIVERED THIS VISIT INCLUDE: NONE   . OBJECTIVE:  See interventions. .  Patient education provided this visit:  Pt educated on proper use of AE for dressing ADL  . Patient level of understanding of education provided: Pt verbalized understanding of education provided by KATHIA [facility] Home exercise program: B UE strengthening against gravity for shoulder flexion/extension, overhead press, chest press, shoulder ab/adduction and elbow flexion/extension  . RESPONSE TO TREATMENT: Pt had a positive response to treatment without increased complaints of pain or fatigue  . ASSESSMENT OF PROGRESS TOWARD GOALS: Pt is Mod I for upper and lower body dressing. Pt is able to utilize adaptive equipment as needed. Pt is Mod I for item retrieval from various surface levels with good balance and safety. Pt is Mod I with housekeeping IADL tasks with use of compensatory strategies as needed. Per pt report he is able to follow UE HEP Ind. Pt is able to identify and implement energy conservation techniques during Functional tasks. Modified Nadeen Scale for PRE score of 6/10. He has reached maximal potential wish skilled OT at this time. Caregiver is able to independently provide care for pt. No further skilled OT is indicated at this time.     .   THE FOLLOWING DISCHARGE PLANNING WAS DISCUSSED WITH THE PATIENT/CAREGIVER: Discharge to self and family under MD supervision once all goals have been met or patient has reached maximum potential.  Dc DC this visit per patient request

## 2022-07-22 NOTE — Clinical Note
Occupational Therapy Discharge - Pt was seen by skilled OT for 3 visits s/p recovery from  Thoracic spondylosis. Pt reports he does not need further OT at this time due to independence with ADLS and IADLs. Pt is Mod I for upper and lower body dressing. Pt is able to utilize adaptive equipment as needed. Pt is Mod I for item retrieval from various surface levels with good balance and safety. Pt is Mod I with housekeeping IADL tasks with use of compensatory strategies as needed. Per pt report he is able to follow UE HEP Ind. Pt is able to identify and implement energy conservation techniques during Functional tasks. Modified Nadeen Scale for PRE score of 6/10. He has reached maximal potential wish skilled OT at this time. No further skilled OT is indicated at this time.

## 2022-07-24 VITALS
HEART RATE: 87 BPM | RESPIRATION RATE: 16 BRPM | SYSTOLIC BLOOD PRESSURE: 120 MMHG | TEMPERATURE: 97.2 F | DIASTOLIC BLOOD PRESSURE: 70 MMHG | OXYGEN SATURATION: 98 %

## 2022-07-25 ENCOUNTER — HOME CARE VISIT (OUTPATIENT)
Dept: HOME HEALTH SERVICES | Facility: HOME HEALTH | Age: 67
End: 2022-07-25
Payer: MEDICARE

## 2022-07-26 ENCOUNTER — HOME CARE VISIT (OUTPATIENT)
Dept: SCHEDULING | Facility: HOME HEALTH | Age: 67
End: 2022-07-26
Payer: MEDICARE

## 2022-07-26 PROCEDURE — G0157 HHC PT ASSISTANT EA 15: HCPCS

## 2022-07-26 NOTE — HOME HEALTH
S: Pt denies falls or changes in medication, c/o 8/10 pain back pain Thoracic/Cervical area pt states exercises increase pain   CAREGIVER PARTICIPATION: Pt is Ind with ADLs  O: TRANSFERS: Ind sit to stand transfers from multiple surfaces in home   THEREX/HEP: Upgraded HEP to standing heel toe raises, mini squats, hip flexion, hip extension, hip abduction x 15 rps x 1 set   TINETTI: 28/28  FTSTS: 19 sec   Ind gait on even/uneven surfaces up to 300ft    EDUCATION: Pt instructed to perform HEP to LEs 2-3x day to maintain current level of function   A: Pt has progressed well all goals resolved with exception of pain management goal, ice or MH assist in reduction of pain, pt has appt with pain management 8/4/2022   Pt is requesting DC this week

## 2022-07-28 ENCOUNTER — HOME CARE VISIT (OUTPATIENT)
Dept: SCHEDULING | Facility: HOME HEALTH | Age: 67
End: 2022-07-28
Payer: MEDICARE

## 2022-07-28 PROCEDURE — G0151 HHCP-SERV OF PT,EA 15 MIN: HCPCS

## 2022-07-28 NOTE — Clinical Note
Patient is discharged due to max potential achieved. The physical therapy exercises to improve strength increased his pain level. He is safe and functional at home. I believe that the trache tube is the primary cause of his pain, and it negatively impacts every aspect of his daily activity.   Thank you for your referral.

## 2022-07-30 VITALS
DIASTOLIC BLOOD PRESSURE: 70 MMHG | HEART RATE: 68 BPM | RESPIRATION RATE: 16 BRPM | TEMPERATURE: 97.2 F | OXYGEN SATURATION: 97 % | SYSTOLIC BLOOD PRESSURE: 102 MMHG

## 2022-09-07 ENCOUNTER — OFFICE VISIT (OUTPATIENT)
Dept: ORTHOPEDIC SURGERY | Age: 67
End: 2022-09-07
Payer: MEDICARE

## 2022-09-07 VITALS — HEART RATE: 78 BPM | OXYGEN SATURATION: 93 % | BODY MASS INDEX: 36.01 KG/M2 | WEIGHT: 251 LBS | TEMPERATURE: 96.1 F

## 2022-09-07 DIAGNOSIS — D17.79 EPIDURAL LIPOMATOSIS: ICD-10-CM

## 2022-09-07 DIAGNOSIS — Z93.0 TRACHEOSTOMY STATUS (HCC): ICD-10-CM

## 2022-09-07 DIAGNOSIS — M47.814 THORACIC SPONDYLOSIS: Primary | ICD-10-CM

## 2022-09-07 PROCEDURE — G8536 NO DOC ELDER MAL SCRN: HCPCS | Performed by: PHYSICAL MEDICINE & REHABILITATION

## 2022-09-07 PROCEDURE — G8427 DOCREV CUR MEDS BY ELIG CLIN: HCPCS | Performed by: PHYSICAL MEDICINE & REHABILITATION

## 2022-09-07 PROCEDURE — 3017F COLORECTAL CA SCREEN DOC REV: CPT | Performed by: PHYSICAL MEDICINE & REHABILITATION

## 2022-09-07 PROCEDURE — 1123F ACP DISCUSS/DSCN MKR DOCD: CPT | Performed by: PHYSICAL MEDICINE & REHABILITATION

## 2022-09-07 PROCEDURE — 99214 OFFICE O/P EST MOD 30 MIN: CPT | Performed by: PHYSICAL MEDICINE & REHABILITATION

## 2022-09-07 PROCEDURE — G8417 CALC BMI ABV UP PARAM F/U: HCPCS | Performed by: PHYSICAL MEDICINE & REHABILITATION

## 2022-09-07 PROCEDURE — 1101F PT FALLS ASSESS-DOCD LE1/YR: CPT | Performed by: PHYSICAL MEDICINE & REHABILITATION

## 2022-09-07 PROCEDURE — G8432 DEP SCR NOT DOC, RNG: HCPCS | Performed by: PHYSICAL MEDICINE & REHABILITATION

## 2022-09-07 RX ORDER — OXYCODONE HYDROCHLORIDE 5 MG/1
5 TABLET ORAL
COMMUNITY

## 2022-09-07 RX ORDER — TIZANIDINE 2 MG/1
2 TABLET ORAL
Qty: 60 TABLET | Refills: 0 | Status: SHIPPED | OUTPATIENT
Start: 2022-09-07
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
## 2022-09-07 NOTE — LETTER
9/8/2022    Patient: Arvid Smoker   YOB: 1955   Date of Visit: 9/7/2022     Ilan Erazo NP  [facility] [address] [address]  [place] [address]  Via Fax: [phone]    Dear Ilan Erazo NP,      Thank you for referring Mr. Bertha Izquierdo to [address] [place] [address] for evaluation. My notes for this consultation are attached. If you have questions, please do not hesitate to call me. I look forward to following your patient along with you.       Sincerely,    Yolis Castellano MD

## 2022-09-28 ENCOUNTER — TRANSCRIBE ORDER (OUTPATIENT)
Dept: SCHEDULING | Age: 67
End: 2022-09-28

## 2022-09-28 DIAGNOSIS — C10.8 MALIGNANT NEOPLASM OF JUNCTIONAL REGION OF OROPHARYNX (HCC): Primary | ICD-10-CM

## 2022-10-06 ENCOUNTER — TRANSCRIBE ORDER (OUTPATIENT)
Dept: SCHEDULING | Age: 67
End: 2022-10-06

## 2022-10-06 DIAGNOSIS — C32.9 CARCINOMA LARYNX (HCC): Primary | ICD-10-CM

## 2022-10-06 DIAGNOSIS — R13.10 PROBLEMS WITH SWALLOWING AND MASTICATION: Primary | ICD-10-CM

## 2022-10-20 ENCOUNTER — HOSPITAL ENCOUNTER (OUTPATIENT)
Dept: CT IMAGING | Age: 67
Discharge: HOME | End: 2022-10-20
Attending: NURSE PRACTITIONER
Payer: MEDICARE

## 2022-10-20 DIAGNOSIS — C10.8 MALIGNANT NEOPLASM OF JUNCTIONAL REGION OF OROPHARYNX (HCC): ICD-10-CM

## 2022-10-20 LAB — CREAT UR-MCNC: 0.6 MG/DL (ref 0.6–1.3)

## 2022-10-20 PROCEDURE — 70491 CT SOFT TISSUE NECK W/DYE: CPT

## 2022-10-20 PROCEDURE — 74011000636 HC RX REV CODE- 636

## 2022-10-20 PROCEDURE — 82565 ASSAY OF CREATININE: CPT

## 2022-10-20 RX ADMIN — IOPAMIDOL 80 ML: 612 INJECTION, SOLUTION INTRAVENOUS at 08:35

## 2022-11-18 NOTE — PERIOP NOTES
.  Pre-Op Summary    Pt arrived via car with family/friend and is oriented to time, place, person and situation. Patient with unsteady gait with cane assistive devices. Visit Vitals  Pulse 83   Temp 98.1 °F (36.7 °C)   Resp 20   Ht 5' 10\" (1.778 m)   Wt 124.1 kg (273 lb 8 oz)   SpO2 94%   BMI 39.24 kg/m²       Peripheral IV located on Left hand . Patients belongings are located [facility]. Patient's point of contact is Vivek Hollis [phone]  and their contact number is: Mikhail Wilkinson They will be in the waiting room. They are able to receive medication information. They will be their ride home. 155

## 2022-12-09 ENCOUNTER — TRANSCRIBE ORDER (OUTPATIENT)
Dept: SCHEDULING | Age: 67
End: 2022-12-09

## 2022-12-09 DIAGNOSIS — C32.1 CANCER OF SUPRAGLOTTIS (HCC): Primary | ICD-10-CM

## 2022-12-19 ENCOUNTER — TELEPHONE (OUTPATIENT)
Dept: ORTHOPEDIC SURGERY | Age: 67
End: 2022-12-19

## 2022-12-19 DIAGNOSIS — D17.79 EPIDURAL LIPOMATOSIS: ICD-10-CM

## 2022-12-19 RX ORDER — TIZANIDINE 2 MG/1
2 TABLET ORAL
Qty: 60 TABLET | Refills: 2 | Status: SHIPPED | OUTPATIENT
Start: 2022-12-19

## 2022-12-19 NOTE — TELEPHONE ENCOUNTER
Last seen by Dr Sara Francis 9-7-22  Last refill 11-15-22 #60 no refills   1 Tablet by Per G Tube route two (2) times daily as needed for Muscle Spasm(s).

## 2022-12-19 NOTE — TELEPHONE ENCOUNTER
Patient called in to request a refill on the prescription tiZANidine (ZANAFLEX) 2 mg tablet     [id number] [place], [address] [place] [address],[address]  [phone]    Ramin advise patient at  [phone]

## 2022-12-20 ENCOUNTER — HOSPITAL ENCOUNTER (OUTPATIENT)
Dept: CT IMAGING | Age: 67
Discharge: HOME | End: 2022-12-20
Attending: RADIOLOGY
Payer: MEDICARE

## 2022-12-20 DIAGNOSIS — C32.1 CANCER OF SUPRAGLOTTIS (HCC): ICD-10-CM

## 2022-12-20 LAB — CREAT UR-MCNC: 0.7 MG/DL (ref 0.6–1.3)

## 2022-12-20 PROCEDURE — 70491 CT SOFT TISSUE NECK W/DYE: CPT

## 2022-12-20 PROCEDURE — 82565 ASSAY OF CREATININE: CPT

## 2022-12-20 PROCEDURE — 74011000636 HC RX REV CODE- 636

## 2022-12-20 PROCEDURE — 71260 CT THORAX DX C+: CPT

## 2022-12-20 RX ADMIN — IOPAMIDOL 100 ML: 612 INJECTION, SOLUTION INTRAVENOUS at 11:46

## 2023-01-18 DIAGNOSIS — D17.79 EPIDURAL LIPOMATOSIS: ICD-10-CM

## 2023-01-18 RX ORDER — TIZANIDINE 2 MG/1
2 TABLET ORAL
Qty: 60 TABLET | Refills: 2 | Status: CANCELLED | OUTPATIENT
Start: 2023-01-18

## 2023-01-18 NOTE — TELEPHONE ENCOUNTER
A chart review was done and the last prescription issued was 12/19/22 with 2 refills. I called and spoke to the pt. The pt was identified using 2 pt identifiers. I made him aware of this and asked him if he filled a prescription in December. He verified that he did. He looked at his bottle and could see that there are 2 refills remaining. He will contact the pharmacy. I also let the pt know that he will need to have a follow up appt before his next prescription can be issued. I offered to schedule an appt with the pt. He states that he will call back at a later time to schedule. He usually comes with his wife.

## 2023-04-06 ENCOUNTER — OFFICE VISIT (OUTPATIENT)
Age: 68
End: 2023-04-06
Payer: MEDICARE

## 2023-04-06 VITALS
SYSTOLIC BLOOD PRESSURE: 116 MMHG | DIASTOLIC BLOOD PRESSURE: 75 MMHG | RESPIRATION RATE: 18 BRPM | TEMPERATURE: 97.6 F | HEIGHT: 70 IN | WEIGHT: 232.6 LBS | OXYGEN SATURATION: 95 % | HEART RATE: 63 BPM | BODY MASS INDEX: 33.3 KG/M2

## 2023-04-06 DIAGNOSIS — D17.79 EPIDURAL LIPOMATOSIS: ICD-10-CM

## 2023-04-06 DIAGNOSIS — M47.814 THORACIC SPONDYLOSIS: ICD-10-CM

## 2023-04-06 DIAGNOSIS — M96.1 LUMBAR POST-LAMINECTOMY SYNDROME: Primary | ICD-10-CM

## 2023-04-06 DIAGNOSIS — Z93.0 TRACHEOSTOMY STATUS (HCC): ICD-10-CM

## 2023-04-06 PROCEDURE — 99214 OFFICE O/P EST MOD 30 MIN: CPT | Performed by: PHYSICAL MEDICINE & REHABILITATION

## 2023-04-06 PROCEDURE — 1123F ACP DISCUSS/DSCN MKR DOCD: CPT | Performed by: PHYSICAL MEDICINE & REHABILITATION

## 2023-04-06 RX ORDER — TIZANIDINE 2 MG/1
2 TABLET ORAL 2 TIMES DAILY PRN
Qty: 180 TABLET | Refills: 3 | Status: SHIPPED | OUTPATIENT
Start: 2023-04-06

## 2023-04-06 RX ORDER — ALUMINUM HYDROXIDE, MAGNESIUM HYDROXIDE, SIMETHICONE 400; 400; 40 MG/10ML; MG/10ML; MG/10ML
SUSPENSION ORAL
COMMUNITY
Start: 2023-04-04

## 2023-04-06 NOTE — PROGRESS NOTES
[address]    [address], [address]  [place] [address]  Phone: [phone]  Fax: [phone]        Thad Dawn  : 1955  PCP: MICHELLE Norris NP    PROGRESS NOTE      ASSESSMENT AND PLAN    Apoorva Vila was seen today for back problem, pain and back pain. Diagnoses and all orders for this visit:    Lumbar post-laminectomy syndrome  -     tiZANidine (ZANAFLEX) 2 MG tablet; 1 tablet by Per G Tube route 2 times daily as needed (pain)    Epidural lipomatosis, jx L3/4    Thoracic spondylosis    Tracheostomy status ([facility] [address]        Susanna Avitia is a 78 y.o. male with history of laryngeal cancer, does well with as needed tizanidine without side effects. Continue PM through Dr. Nilsa Bell  RF as needed Zanaflex. HISTORY OF PRESENT ILLNESS      Susanna Avitia is a 78 y.o. male presents for follow up of back pain. LV 2022. His low back pain remains unchanged. Denies sciatica. He is still in PM with Dr. Nilsa Bell. She will not fill his Zanaflex. He takes it as needed with benefit. Denies side effects. Denies dyspnea while on tizanidine. AMB PAIN ASSESSMENT 2023   Location of Pain Back   Severity of Pain 6   Quality of Pain Dull;Aching   Frequency of Pain Constant   Aggravating Factors Bending;Stretching;Straightening;Exercise;Kneeling;Squatting;Standing;Walking;Stairs   Limiting Behavior Yes   Relieving Factors Rest;Other (Comment)   Result of Injury Yes           Investigations:  T MRI 2022: mild to moderate multilevel FA with no stenosis or fx   CXR 2022: Multilevel degenerative changes no acute findings   L XR 2022:  ALIF L4-5 intact, listhesis L3-4  L MRI 10/2020: FA L3-4 with effusions and epidural lipomatosis  L MRI : stenosis L3/4  C MRI : multilevel deg changes, relative stenosis      Treatments patient has tried:  Physical therapy: home health 2022, 2020 some benefit  Beneficial medications: Ibuprofen

## 2023-04-06 NOTE — PROGRESS NOTES
Heather Rollins presents today for   Chief Complaint   Patient presents with    Back Problem    Pain    Back Pain       Is someone accompanying this pt? no    Is the patient using any DME equipment during OV? no    Depression Screening:  No flowsheet data found. Learning Assessment:  No flowsheet data found. Abuse Screening:  No flowsheet data found. Fall Risk  No flowsheet data found. OPIOID RISK TOOL  No flowsheet data found. Coordination of Care:  1. Have you been to the ER, urgent care clinic since your last visit? no  Hospitalized since your last visit? no    2. Have you seen or consulted any other health care providers outside of the [address] [place] [address] since your last visit? no Include any pap smears or colon screening.  no

## 2023-04-25 NOTE — ADDENDUM NOTE
CV ICU Progress Note  4/25/2023      CO-MORBIDITIES:   Patient Active Problem List   Diagnosis     Nuclear sclerotic cataract of both eyes     Hyperglycemia     Hyperlipidemia     Stented coronary artery     Coronary artery disease involving native coronary artery without angina pectoris     Basal cell carcinoma (BCC) in situ of skin     Colon polyp     Hereditary and idiopathic peripheral neuropathy     Status post coronary angiogram     Coronary artery disease involving native coronary artery of native heart without angina pectoris       ASSESSMENT: Reinaldo Gordillo is a 76 year old male with PMH of CAD with VT s/p AREN x4 in RCA, mild aortic stenosis, HTN, HLD, GERD, who underwent CABG x 4 on 4/24 by Dr. Almeida. Grafts include: LIMA to LAD, SVG to OM, SVG to Diag, SVG to GERALDINE.      Interval Changes:  NAEO  1 episode of emesis while standing  Off pressors  Pain is well controlled     PLAN:  Neuro/ pain/ sedation:  Acute Postoperative pain  - Monitor neurological status. Notify the MD for any acute changes in exam.  - Pain: fentanyl gtt. Scheduled tylenol. PRN tylenol, oxycodone, dilaudid.  - CESAR Catheters in place bilaterally   - Hold PTA zolpidem     Pulmonary care:   Postoperative ventilation management     Fast track, trail pressure support   - Extubated 4/24   - Titrate supplemental oxygen to maintain saturation above 92%.  - Pulmonary hygiene: Incentive spirometer every 15- 30 minutes when awake, flutter valve, C&DB    Cardiovascular:    S/p CABG x 4 on 4/24 by Dr. Almeida  History of CAD, HTN, HLD  Pre CPB: LV normal function, EF 55-60%, no RWMA's. Normal diastolic function. RV normal function. Tri-leaflet aortic valve, calcified leaflets, mild AS. Trace TR. Trace MR. No PFO by CFD. No pericardial fluid. No pleural effusion. No aortic dissection. No intracardiac thrombus noted.    Post CPB: S/p CABG x 4. LV normal function, EF 55-60%, no RWMA's noted. RV normal function. Mild AS. Mild TR. Trace MR. No pericardial  Addended by: Faye Barajas on: 6/29/2022 11:30 AM     Modules accepted: Orders effusion. No pleural effusion. No aortic dissection. No intracardiac thrombus noted.     - Monitor hemodynamic status.   - Goal MAP>65, SBP<140  - Hold PTA lisinopril, metoprolol, pravastatin  - Statin hold  - BB hold  - ASA: start today  - Epi, norepi, vaso gtt; wean as tolerated    GI care/ Nutrition:   - ADAT   - PPI  - Continue bowel regimen: miralax, senna  - Swallow study once extubated     Renal/ Fluid Balance/ Electrolytes:   BL creat appears to be ~ 0.8-1.0  - Strict I/O, daily weights  - Avoid/limit nephrotoxins as able  - Replete lytes PRN per protocol    Endocrine:    Stress induced hyperglycemia  Preop A1c 6.2  - Goal BG <180 for optimal healing  - PTA meds None    ID/ Antibiotics:  Stress induced leukocytosis  - To complete perioperative regimen  - Continue to monitor fever curve, WBC and inflammatory markers as appropriate    Heme:     Stress induced leukocytosis  No s/sx active bleeding  - Continue to monitor  - CBC     MSK/ Skin:  Sternotomy  Surgical Incision  - Sternal precautions  - Postoperative incision management per protocol  - PT/OT/CR    Prophylaxis:    - Mechanical prophylaxis for DVT  - Chemical DVT prophylaxis - SQH  - PPI    Lines/ tubes/ drains:  - Arterial Line, CTs, PA catheter, RIJ, mary  - Remove all lines except for CT    Disposition:  - 6C    Patient seen, findings and plan discussed with CVICU staff    Konstantin Claudio MD  Anesthesiology CA-1/PGY-3    ====================================    HPI:     One episode of emesis overnight. Otherwise no acute events. Pain is well controlled. No complaints.       PAST MEDICAL HISTORY:   Past Medical History:   Diagnosis Date     ASCVD (arteriosclerotic cardiovascular disease)      Coronary artery disease      Gastroesophageal reflux disease      Hypertension      Stented coronary artery        PAST SURGICAL HISTORY:   Past Surgical History:   Procedure Laterality Date     CATARACT IOL, RT/LT       COLONOSCOPY N/A 7/17/2019    Procedure:  COLONOSCOPY;  Surgeon: Roque Givens MD;  Location: [facility] OR     CV CORONARY ANGIOGRAM N/A 3/21/2023    Procedure: Coronary Angiogram;  Surgeon: Pito Chapin MD;  Location:  HEART CARDIAC CATH LAB     CV PCI N/A 3/21/2023    Procedure: Percutaneous Coronary Intervention;  Surgeon: Pito Chapin MD;  Location: [facility] CARDIAC CATH LAB     ESOPHAGOSCOPY, GASTROSCOPY, DUODENOSCOPY (EGD), COMBINED N/A 7/17/2019    Procedure: ESOPHAGOGASTRODUODENOSCOPY, WITH BIOPSY;  Surgeon: Roque Givens MD;  Location: [facility] OR     HERNIA REPAIR  2000     MOHS MICROGRAPHIC PROCEDURE       PHACOEMULSIFICATION CLEAR CORNEA WITH STANDARD INTRAOCULAR LENS IMPLANT Right 7/2/2021    Procedure: RIGHT EYE CATARACT REMOVAL WITH INTRAOCULAR LENS IMPLANT;  Surgeon: Justa Liz MD;  Location: [facility] OR     PHACOEMULSIFICATION CLEAR CORNEA WITH STANDARD INTRAOCULAR LENS IMPLANT Left 7/9/2021    Procedure: LEFT EYE CATARACT REMOVAL WITH INTRAOCULAR LENS IMPLANT;  Surgeon: Justa Liz MD;  Location: [facility] OR     [facility] CORONARY STENT PERCUT, EA ADDTL VESSEL         FAMILY HISTORY:   Family History   Problem Relation Age of Onset     Glaucoma No family hx of      Macular Degeneration No family hx of      Retinal detachment No family hx of      Amblyopia No family hx of      Melanoma No family hx of      Skin Cancer No family hx of        SOCIAL HISTORY:   Social History     Tobacco Use     Smoking status: Never     Smokeless tobacco: Never   Vaping Use     Vaping status: Not on file   Substance Use Topics     Alcohol use: Yes     Comment: 1-2 drinks 5 days a week         OBJECTIVE:   1. VITAL SIGNS:     Temp:  [96.4  F (35.8  C)-98.7  F (37.1  C)] 98  F (36.7  C)  Pulse:  [58-80] 76  Resp:  [11-24] 18  BP: ()/(52-89) 92/52  MAP:  [57 mmHg-99 mmHg] 77 mmHg  Arterial Line BP: ()/(16-69) 123/50  FiO2 (%):  [40 %] 40 %  SpO2:  [90 %-100 %] 93 %          2. INTAKE/ OUTPUT:      I/O last 3 completed shifts:  In: 2791.6  [I.V.:1616.6; Other:175; IV Piggyback:1000]  Out: 1492 [Urine:1157; Emesis/NG output:75; Chest Tube:260]      3. PHYSICAL EXAMINATION:   General: alert and oriented  Resp: No increased WOB on NC  CV: S1, S2, RRR, no m/r/g   Abdomen: Soft, non-distended, non-tender  Incisions: c/d/i  Extremities: warm and well perfused  CT: To suction, serosang output, no airleak, crepitus    4. INVESTIGATIONS:   Arterial Blood Gases   Recent Labs   Lab 04/25/23  0405 04/24/23 2005 04/24/23  1653 04/24/23  1518   PH 7.40 7.36 7.34* 7.36   PCO2 41 36 43 40   PO2 65* 92 136* 160*   HCO3 25 21 23 23     Complete Blood Count   Recent Labs   Lab 04/25/23  0405 04/24/23 2142 04/24/23  1518 04/24/23  1409 04/24/23  1325 04/24/23  1255   WBC 10.2 10.4 13.2*  --   --   --    HGB 9.5* 10.2* 10.2* 10.1*   < > 8.6*    160 171  --   --  157    < > = values in this interval not displayed.     Basic Metabolic Panel  Recent Labs   Lab 04/25/23  0603 04/25/23  0513 04/25/23  0407 04/25/23 0405 04/24/23 2148 04/24/23 2142 04/24/23  1521 04/24/23  1518 04/24/23  1409   0000   NA  --   --   --  139  --  141  --  142 141  --    POTASSIUM  --   --   --  4.4  --  4.1  --  3.9 3.8  --    CHLORIDE  --   --   --  107  --  110*  --  109*  --   --    CO2  --   --   --  22  --  20*  --  21*  --   --    BUN  --   --   --  12.3  --  13.0  --  12.7  --   --    CR  --   --   --  0.78  --  0.82  --  0.80  --   --    * 148* 123* 129*   < > 159*   < > 167* 167*   < >    < > = values in this interval not displayed.     Liver Function Tests  Recent Labs   Lab 04/25/23  0405 04/24/23  1518 04/24/23  1255   AST 45 39  --    ALT 29 34  --    ALKPHOS 39* 44  --    BILITOTAL 0.3 0.5  --    ALBUMIN 3.2* 3.4*  --    INR  --  1.31* 1.49*     Pancreatic Enzymes  No lab results found in last 7 days.  Coagulation Profile  Recent Labs   Lab 04/24/23  1518 04/24/23  1255   INR 1.31* 1.49*   PTT 42* 24         5. RADIOLOGY:   Recent Results (from the past 24  hour(s))   XR Chest Port 1 View    Narrative    Exam: XR CHEST PORT 1 VIEW, 4/24/2023 3:59 PM    Indication: Post Op CVTS Surgery    Comparison: 4/12/2023    Findings:   Single portable AP view of the chest. Post surgical changes of CABG  with intact midline sternotomy wires. Endotracheal tube in the  midthoracic trachea. Right IJ Crandall-Fausto catheter with tip in the  proximal main pulmonary artery. Mediastinal drains and left chest  tube. Gastric tube with tip and sidehole in the stomach. Chest wall  anesthesia catheters.    Trachea is midline. No pneumothorax. Silhouetting of the left  hemidiaphragm, likely small left pleural effusion with associated  atelectasis. Perihilar and basilar predominant hazy opacities of the  lungs. No focal consolidations. Normal cardiac silhouette size. No  acute osseous abnormalities. Unremarkable upper abdomen.      Impression    Impression:   1. Crandall-Fausto catheter with the tip in the proximal main pulmonary  artery. Additional support devices as in the body of the report.  2. Postsurgical changes of CABG with intact midline sternotomy wires.  3. Small left pleural effusion and hazy opacities of likely pulmonary  edema and/or atelectasis.   XR Chest Port 1 View    Impression    RESIDENT PRELIMINARY INTERPRETATION  Impression:   1. Interval removal of endotracheal tube and gastric tube. Remaining  support devices stable.  2. Unchanged perihilar/basilar opacities, atelectasis versus edema.       =========================================

## 2023-07-19 ENCOUNTER — OFFICE VISIT (OUTPATIENT)
Age: 68
End: 2023-07-19
Payer: MEDICARE

## 2023-07-19 VITALS
DIASTOLIC BLOOD PRESSURE: 80 MMHG | OXYGEN SATURATION: 97 % | SYSTOLIC BLOOD PRESSURE: 122 MMHG | HEART RATE: 72 BPM | BODY MASS INDEX: 32.35 KG/M2 | HEIGHT: 70 IN | WEIGHT: 226 LBS

## 2023-07-19 DIAGNOSIS — J42 CHRONIC BRONCHITIS, UNSPECIFIED CHRONIC BRONCHITIS TYPE (HCC): Primary | ICD-10-CM

## 2023-07-19 DIAGNOSIS — R07.9 CHEST PAIN, UNSPECIFIED TYPE: ICD-10-CM

## 2023-07-19 DIAGNOSIS — I42.9 CARDIOMYOPATHY, UNSPECIFIED TYPE (HCC): ICD-10-CM

## 2023-07-19 PROBLEM — Z93.1 PRESENCE OF GASTROSTOMY (HCC): Status: ACTIVE | Noted: 2022-08-16

## 2023-07-19 PROBLEM — J18.9 PNEUMONIA OF RIGHT LOWER LOBE DUE TO INFECTIOUS ORGANISM: Status: ACTIVE | Noted: 2023-07-19

## 2023-07-19 PROBLEM — E78.5 HYPERLIPIDEMIA: Status: ACTIVE | Noted: 2022-08-17

## 2023-07-19 PROBLEM — G89.29 CHRONIC PAIN: Status: ACTIVE | Noted: 2023-07-19

## 2023-07-19 PROBLEM — J96.90 RESPIRATORY FAILURE (HCC): Status: ACTIVE | Noted: 2023-05-09

## 2023-07-19 PROBLEM — Z93.0 TRACHEOSTOMY PRESENT (HCC): Status: ACTIVE | Noted: 2022-08-16

## 2023-07-19 PROBLEM — J90 PLEURAL EFFUSION: Status: ACTIVE | Noted: 2023-07-19

## 2023-07-19 PROBLEM — C32.1 PRIMARY SQUAMOUS CELL CARCINOMA OF SUPRAGLOTTIS (HCC): Status: ACTIVE | Noted: 2023-07-19

## 2023-07-19 PROCEDURE — 99204 OFFICE O/P NEW MOD 45 MIN: CPT | Performed by: INTERNAL MEDICINE

## 2023-07-19 PROCEDURE — 1123F ACP DISCUSS/DSCN MKR DOCD: CPT | Performed by: INTERNAL MEDICINE

## 2023-07-19 PROCEDURE — 93000 ELECTROCARDIOGRAM COMPLETE: CPT | Performed by: INTERNAL MEDICINE

## 2023-07-19 RX ORDER — OXYCODONE AND ACETAMINOPHEN 10; 325 MG/1; MG/1
1 TABLET ORAL EVERY 4 HOURS PRN
COMMUNITY

## 2023-07-19 RX ORDER — CARVEDILOL 3.12 MG/1
TABLET ORAL
COMMUNITY
Start: 2023-05-24 | End: 2023-07-19

## 2023-07-19 RX ORDER — SPIRONOLACTONE 25 MG/1
12.5 TABLET ORAL DAILY
Qty: 90 TABLET | Refills: 3 | Status: SHIPPED | OUTPATIENT
Start: 2023-07-19

## 2023-07-19 RX ORDER — CARVEDILOL 3.12 MG/1
3.12 TABLET ORAL 2 TIMES DAILY WITH MEALS
Qty: 180 TABLET | Refills: 3 | Status: SHIPPED | OUTPATIENT
Start: 2023-07-19

## 2023-07-19 RX ORDER — ASPIRIN 81 MG/1
81 TABLET, CHEWABLE ORAL DAILY
COMMUNITY
Start: 2023-05-25

## 2023-07-19 RX ORDER — SPIRONOLACTONE 25 MG/1
25 TABLET ORAL
COMMUNITY
Start: 2023-05-24 | End: 2023-07-19 | Stop reason: SDUPTHER

## 2023-07-19 RX ORDER — CARVEDILOL 3.12 MG/1
3.12 TABLET ORAL 2 TIMES DAILY WITH MEALS
COMMUNITY
Start: 2023-05-24 | End: 2023-07-19 | Stop reason: SDUPTHER

## 2023-07-19 ASSESSMENT — ANXIETY QUESTIONNAIRES
2. NOT BEING ABLE TO STOP OR CONTROL WORRYING: 0
7. FEELING AFRAID AS IF SOMETHING AWFUL MIGHT HAPPEN: 0
GAD7 TOTAL SCORE: 0
5. BEING SO RESTLESS THAT IT IS HARD TO SIT STILL: 0
6. BECOMING EASILY ANNOYED OR IRRITABLE: 0
4. TROUBLE RELAXING: 0
1. FEELING NERVOUS, ANXIOUS, OR ON EDGE: 0
3. WORRYING TOO MUCH ABOUT DIFFERENT THINGS: 0

## 2023-07-19 ASSESSMENT — PATIENT HEALTH QUESTIONNAIRE - PHQ9
1. LITTLE INTEREST OR PLEASURE IN DOING THINGS: 0
2. FEELING DOWN, DEPRESSED OR HOPELESS: 0
SUM OF ALL RESPONSES TO PHQ QUESTIONS 1-9: 0
SUM OF ALL RESPONSES TO PHQ9 QUESTIONS 1 & 2: 0
SUM OF ALL RESPONSES TO PHQ QUESTIONS 1-9: 0

## 2023-07-19 NOTE — PROGRESS NOTES
HISTORY OF PRESENT ILLNESS  Jeaneth Bradley  78 y.o. male     Chief Complaint   Patient presents with    New Patient    Follow-Up from Hospital     Respiratory failure       ASSESSMENT and PLAN    The primary encounter diagnosis was Chronic bronchitis, unspecified chronic bronchitis type ([address] Diagnoses of Chest pain, unspecified type and Cardiomyopathy, unspecified type [facility]) were also pertinent to this visit. Mr. Anastacio Henderson has no previously documented history of CAD. He does have history of palpitations. He also has history of prostate carcinoma diagnosed around 2009. 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 He has tracheostomy in place. He has prior smoking history but no cigarettes since 2009. His wife was Bravo Fowler who passed away in February 2023 from respiratory failure. Historically, his LV function had been within normal limits. However, he was admitted to [facility] 2023 with shortness of breath and large right pleural effusion. Echocardiogram revealed EF 35% without significant regional wall motion abnormality but was qualify due to frequent PVCs. He also had nuclear scan which was reported as nondiagnostic. He was started on spironolactone, Entresto, carvedilol, and aspirin. He was noted to be in LBBB. From cardiac standpoint, he has no chest pains. However, with diminished LV function, repeat nuclear scan has been requested. His blood pressure is well controlled 122/80. His rhythm remains stable sinus at 72 bpm.  There is no evidence of decompensated CHF on physical examination. His weight today is 226 pounds. He has not smoked since 2009. His target LDL is less than 90. He remains on baby aspirin daily. He was seen by pulmonary while he was hospitalized at [facility]. They recommended follow-up with [facility] fu. He has been trying in touch with [facility] already for some time without success.   We will try to get him into see

## 2023-07-19 NOTE — PATIENT INSTRUCTIONS
If you have not heard from the central scheduler to schedule your testing in 48 hours, please call [phone].

## 2023-07-19 NOTE — PROGRESS NOTES
Alona Segura presents today for   Chief Complaint   Patient presents with    New Patient    Follow-Up from Hospital     Respiratory failure       Alona Segura preferred language for health care discussion is english/other. Is someone accompanying this pt? no    Is the patient using any DME equipment during OV? no    Depression Screening:  Depression: Not at risk    PHQ-2 Score: 0        Learning Assessment:  Who is the primary learner? Patient    What is the preferred language for health care of the primary learner? ENGLISH    How does the primary learner prefer to learn new concepts? DEMONSTRATION    Answered By patient    Relationship to Learner SELF           Pt currently taking Anticoagulant therapy? no    Pt currently taking Antiplatelet therapy ? no      Coordination of Care:  1. Have you been to the ER, urgent care clinic since your last visit? Hospitalized since your last visit? no    2. Have you seen or consulted any other health care providers outside of the [address] [place] [address] since your last visit? Include any pap smears or colon screening.  no

## 2023-09-07 ENCOUNTER — CLINICAL DOCUMENTATION (OUTPATIENT)
Age: 68
End: 2023-09-07

## 2023-09-07 NOTE — PROGRESS NOTES
L/M to schedule NPA w/ first available from [facility] [facility], [place] for chronic bronchitis.

## (undated) DEVICE — Device

## (undated) DEVICE — REM POLYHESIVE ADULT PATIENT RETURN ELECTRODE: Brand: VALLEYLAB

## (undated) DEVICE — TRAP SPEC COLL POLYP POLYSTYR --

## (undated) DEVICE — AIRLIFE™ NASAL OXYGEN CANNULA CURVED, NONFLARED TIP WITH 14 FOOT (4.3 M) CRUSH-RESISTANT TUBING, OVER-THE-EAR STYLE: Brand: AIRLIFE™

## (undated) DEVICE — CATH IV SAFE STR 22GX1IN BLU -- PROTECTIV PLUS

## (undated) DEVICE — SNARE POLYP M W27MMXL240CM OVL STIFF DISP CAPTIVATOR

## (undated) DEVICE — FLEX ADVANTAGE 1500CC: Brand: FLEX ADVANTAGE

## (undated) DEVICE — MEDI-VAC NON-CONDUCTIVE SUCTION TUBING: Brand: CARDINAL HEALTH